# Patient Record
Sex: FEMALE | Race: BLACK OR AFRICAN AMERICAN | NOT HISPANIC OR LATINO
[De-identification: names, ages, dates, MRNs, and addresses within clinical notes are randomized per-mention and may not be internally consistent; named-entity substitution may affect disease eponyms.]

---

## 2021-02-08 PROBLEM — Z00.00 ENCOUNTER FOR PREVENTIVE HEALTH EXAMINATION: Status: ACTIVE | Noted: 2021-02-08

## 2021-02-10 ENCOUNTER — APPOINTMENT (OUTPATIENT)
Dept: SURGERY | Facility: CLINIC | Age: 26
End: 2021-02-10
Payer: COMMERCIAL

## 2021-02-10 ENCOUNTER — APPOINTMENT (OUTPATIENT)
Dept: BARIATRICS | Facility: CLINIC | Age: 26
End: 2021-02-10

## 2021-02-10 VITALS
SYSTOLIC BLOOD PRESSURE: 127 MMHG | BODY MASS INDEX: 45.98 KG/M2 | WEIGHT: 276 LBS | HEART RATE: 103 BPM | DIASTOLIC BLOOD PRESSURE: 82 MMHG | OXYGEN SATURATION: 100 % | HEIGHT: 65 IN

## 2021-02-10 DIAGNOSIS — H54.7 UNSPECIFIED VISUAL LOSS: ICD-10-CM

## 2021-02-10 PROCEDURE — 99243 OFF/OP CNSLTJ NEW/EST LOW 30: CPT

## 2021-02-10 PROCEDURE — 99072 ADDL SUPL MATRL&STAF TM PHE: CPT

## 2021-02-18 ENCOUNTER — NON-APPOINTMENT (OUTPATIENT)
Age: 26
End: 2021-02-18

## 2021-03-01 NOTE — HISTORY OF PRESENT ILLNESS
[de-identified] : RACHEAL BROOKE is a 25 year old female who present in the office with morbid obesity (BMI  45.93 kg/m2)  for bariatric surgery consultation. Patient was self  referred The patient has tried multiple methods to lose weight in the past including diets, Calorie-counting, restricted intake, portion control and exercise without any long term success. The patient has been obese all her life.  Patient lowest weight was 180 lb 6 years ago. Patient feels that weight loss surgery is the only option at this point for effective and clinically meaningful weight loss. Patient seek weight loss surgery for improvement of their activity level, health and comorbid conditions. Her  PMHX includes  PCOS. Patient stated that she is interested on Laparoscopic Sleeve Gastrectomy\par \par PCP KENDY Whittaker \par \par \par

## 2021-03-01 NOTE — CONSULT LETTER
[Dear  ___] : Dear  [unfilled], [Consult Closing:] : Thank you very much for allowing me to participate in the care of this patient.  If you have any questions, please do not hesitate to contact me. [Sincerely,] : Sincerely, [Consult Letter:] : I had the pleasure of evaluating your patient, [unfilled]. [FreeTextEntry3] : Dr Cedeño

## 2021-03-01 NOTE — PLAN
[FreeTextEntry1] : Please follow up at the office within 3 month and as needed for any questions or concerns \par Please make an appointment with Bariatric Coordinator at [facility]

## 2021-03-01 NOTE — ASSESSMENT
[FreeTextEntry1] : RACHEAL BROOKE is a 25 year old female with morbid obesity (BMI  45.93 kg/m2) for SLEEVE GASTRECTOMY Risks, benefits alternatives discussed. All questions answered. Risks discussed included: death, leak, stricture, DVT/PE, portomesenteric venous thrombosis, need for further procedures, tests or surgery, vitamin deficiency, gallstones, renal stones, inadequate weight loss, weight regain, need for open procedure, incisional hernia, bleeding, post-operative nausea/vomiting requiring hospitalization and intestinal obstruction.\par \par \par 1) Standard preoperative bariatric workup to include cardiology and pulmonology clearance, upper endoscopy with biopsy for H pylori, and preoperative fasting blood work, abdominal Ultrasound.\par 2) Patient will need to meet with psychologist and nutritionist for preoperative counseling regarding dietary goals, lifestyle changes, and postoperative expectations\par 3) We will assess the need for a medically-supervised diet, if required by the patient's insurer\par 4) Patient must attend 2 information session with bariatric coordinator and team\par 5) Patient should call insurance to ensure coverage for bariatric surgery\par 6) Bariatric Coordinator \par 7) Follow up 3 month \par

## 2021-03-01 NOTE — PHYSICAL EXAM
[Normal] : affect appropriate [Obese] : obese [de-identified] : Breath sounds equal and bilateral, no wheezing no rales or rhonchi  [de-identified] :  good S1, S2, no m/r/g bilateral  [de-identified] : Normoactive bowel sounds, soft and nontender, no hepatosplenomegaly or masses noted,

## 2021-03-10 ENCOUNTER — APPOINTMENT (OUTPATIENT)
Dept: SURGERY | Facility: CLINIC | Age: 26
End: 2021-03-10

## 2021-03-12 ENCOUNTER — RESULT REVIEW (OUTPATIENT)
Age: 26
End: 2021-03-12

## 2021-03-12 ENCOUNTER — APPOINTMENT (OUTPATIENT)
Dept: ULTRASOUND IMAGING | Facility: HOSPITAL | Age: 26
End: 2021-03-12
Payer: COMMERCIAL

## 2021-03-12 ENCOUNTER — OUTPATIENT (OUTPATIENT)
Dept: OUTPATIENT SERVICES | Facility: HOSPITAL | Age: 26
LOS: 1 days | End: 2021-03-12
Payer: COMMERCIAL

## 2021-03-12 DIAGNOSIS — E66.01 MORBID (SEVERE) OBESITY DUE TO EXCESS CALORIES: ICD-10-CM

## 2021-03-12 PROCEDURE — 76700 US EXAM ABDOM COMPLETE: CPT

## 2021-03-12 PROCEDURE — 76700 US EXAM ABDOM COMPLETE: CPT | Mod: 26

## 2021-03-15 LAB
25(OH)D3 SERPL-MCNC: 19.3 NG/ML
ALBUMIN SERPL ELPH-MCNC: 4.2 G/DL
ALP BLD-CCNC: 94 U/L
ALT SERPL-CCNC: 10 U/L
ANION GAP SERPL CALC-SCNC: 8 MMOL/L
APTT BLD: 31.7 SEC
AST SERPL-CCNC: 12 U/L
BASOPHILS # BLD AUTO: 0.05 K/UL
BASOPHILS NFR BLD AUTO: 0.5 %
BILIRUB SERPL-MCNC: 0.3 MG/DL
BUN SERPL-MCNC: 12 MG/DL
CALCIUM SERPL-MCNC: 9.6 MG/DL
CALCIUM SERPL-MCNC: 9.6 MG/DL
CHLORIDE SERPL-SCNC: 106 MMOL/L
CHOLEST SERPL-MCNC: 138 MG/DL
CO2 SERPL-SCNC: 25 MMOL/L
CREAT SERPL-MCNC: 1.03 MG/DL
EOSINOPHIL # BLD AUTO: 0.3 K/UL
EOSINOPHIL NFR BLD AUTO: 3.1 %
ESTIMATED AVERAGE GLUCOSE: 105 MG/DL
FERRITIN SERPL-MCNC: 35 NG/ML
FOLATE SERPL-MCNC: 5.1 NG/ML
GLUCOSE SERPL-MCNC: 86 MG/DL
HBA1C MFR BLD HPLC: 5.3 %
HCT VFR BLD CALC: 36.5 %
HDLC SERPL-MCNC: 40 MG/DL
HGB BLD-MCNC: 10.6 G/DL
IMM GRANULOCYTES NFR BLD AUTO: 0.3 %
INR PPP: 1.16 RATIO
IRON SATN MFR SERPL: 8 %
IRON SERPL-MCNC: 28 UG/DL
LDLC SERPL CALC-MCNC: 88 MG/DL
LYMPHOCYTES # BLD AUTO: 2.15 K/UL
LYMPHOCYTES NFR BLD AUTO: 22.3 %
MAN DIFF?: NORMAL
MCHC RBC-ENTMCNC: 24.9 PG
MCHC RBC-ENTMCNC: 29 GM/DL
MCV RBC AUTO: 85.9 FL
MONOCYTES # BLD AUTO: 0.51 K/UL
MONOCYTES NFR BLD AUTO: 5.3 %
NEUTROPHILS # BLD AUTO: 6.61 K/UL
NEUTROPHILS NFR BLD AUTO: 68.5 %
NONHDLC SERPL-MCNC: 98 MG/DL
PARATHYROID HORMONE INTACT: 19 PG/ML
PLATELET # BLD AUTO: 418 K/UL
POTASSIUM SERPL-SCNC: 4.1 MMOL/L
PROT SERPL-MCNC: 7.4 G/DL
PT BLD: 13.6 SEC
RBC # BLD: 4.25 M/UL
RBC # FLD: 14 %
SODIUM SERPL-SCNC: 139 MMOL/L
TIBC SERPL-MCNC: 336 UG/DL
TRIGL SERPL-MCNC: 54 MG/DL
TSH SERPL-ACNC: 1.48 UIU/ML
UIBC SERPL-MCNC: 308 UG/DL
VIT B12 SERPL-MCNC: 478 PG/ML
WBC # FLD AUTO: 9.65 K/UL

## 2021-03-17 ENCOUNTER — APPOINTMENT (OUTPATIENT)
Dept: SURGERY | Facility: CLINIC | Age: 26
End: 2021-03-17
Payer: COMMERCIAL

## 2021-03-17 VITALS
BODY MASS INDEX: 45.98 KG/M2 | DIASTOLIC BLOOD PRESSURE: 81 MMHG | WEIGHT: 276 LBS | HEART RATE: 90 BPM | HEIGHT: 65 IN | SYSTOLIC BLOOD PRESSURE: 123 MMHG

## 2021-03-17 PROCEDURE — 99072 ADDL SUPL MATRL&STAF TM PHE: CPT

## 2021-03-17 PROCEDURE — 99213 OFFICE O/P EST LOW 20 MIN: CPT

## 2021-03-19 LAB — VIT B1 SERPL-MCNC: 84.5 NMOL/L

## 2021-03-19 NOTE — ASSESSMENT
[FreeTextEntry1] : RACHEAL BROOKE is a 25 year old female with morbid obesity (BMI 45.93 kg/m2) who present in the office for pre-operative follow-up and weight management program in preparation for bariatric surgery.  Patient is here for monthly weigh ins. Today's weight is 276 lb and BMI 45.93 kg/m2\par Patient is making good food choices, working on eating smaller portions and becoming more mindful.  Patient has made a concerted effort to eat more balanced and nutritionally sound meals, and to engage in some level of physical activity on a regular basis. Patient continues to struggle with weight loss despite continuous concerted efforts since the prior visit.\par \par Today is 2nd month of weight ins

## 2021-03-19 NOTE — PLAN
[FreeTextEntry1] : Please follow up at the office within 1 month and as needed for any questions or concerns

## 2021-03-19 NOTE — REASON FOR VISIT
[Follow-Up Visit] : a follow-up visit for [Morbid Obesity (BMI>40)] : morbid obesity (bmi>40) [FreeTextEntry2] : Weigh ins

## 2021-03-19 NOTE — HISTORY OF PRESENT ILLNESS
[de-identified] : RACHEAL BROOKE is a 25 year old female with morbid obesity (BMI 45.93 kg/m2) who present in the office for pre-operative follow-up and weight management program in preparation for bariatric surgery.  Patient is here for monthly weigh ins. Today's weight is 276 lb and BMI 45.93 kg/m2. Patient is making good food choices, working on eating smaller portions and becoming more mindful.  Patient has made a concerted effort to eat more balanced and nutritionally sound meals, and to engage in some level of physical activity on a regular basis. Patient continues to struggle with weight loss despite continuous concerted efforts since the prior visit. Patient had Abdominal US that showed 6 mm gallbladder polyp. Patient is nonsymptomatic we will repeat abdominal US in 6 month \par \par

## 2021-03-19 NOTE — PHYSICAL EXAM
[Normal] : affect appropriate [Obese] : obese [de-identified] : Breath sounds equal and bilateral, no wheezing no rales or rhonchi  [de-identified] :  good S1, S2, no m/r/g bilateral  [de-identified] : Normoactive bowel sounds, soft and nontender, no hepatosplenomegaly or masses noted,

## 2021-04-12 ENCOUNTER — NON-APPOINTMENT (OUTPATIENT)
Age: 26
End: 2021-04-12

## 2021-04-12 ENCOUNTER — APPOINTMENT (OUTPATIENT)
Dept: CARDIOLOGY | Facility: CLINIC | Age: 26
End: 2021-04-12
Payer: COMMERCIAL

## 2021-04-12 ENCOUNTER — APPOINTMENT (OUTPATIENT)
Dept: GASTROENTEROLOGY | Facility: CLINIC | Age: 26
End: 2021-04-12
Payer: COMMERCIAL

## 2021-04-12 VITALS
TEMPERATURE: 97 F | SYSTOLIC BLOOD PRESSURE: 117 MMHG | DIASTOLIC BLOOD PRESSURE: 75 MMHG | OXYGEN SATURATION: 100 % | WEIGHT: 280 LBS | HEART RATE: 80 BPM | HEIGHT: 65 IN | BODY MASS INDEX: 46.65 KG/M2

## 2021-04-12 VITALS
HEIGHT: 65 IN | RESPIRATION RATE: 16 BRPM | DIASTOLIC BLOOD PRESSURE: 75 MMHG | BODY MASS INDEX: 46.65 KG/M2 | HEART RATE: 80 BPM | OXYGEN SATURATION: 100 % | TEMPERATURE: 97 F | WEIGHT: 280 LBS | SYSTOLIC BLOOD PRESSURE: 117 MMHG

## 2021-04-12 DIAGNOSIS — Z01.818 ENCOUNTER FOR OTHER PREPROCEDURAL EXAMINATION: ICD-10-CM

## 2021-04-12 PROCEDURE — 99072 ADDL SUPL MATRL&STAF TM PHE: CPT

## 2021-04-12 PROCEDURE — 99205 OFFICE O/P NEW HI 60 MIN: CPT

## 2021-04-12 PROCEDURE — 99204 OFFICE O/P NEW MOD 45 MIN: CPT

## 2021-04-12 PROCEDURE — 93000 ELECTROCARDIOGRAM COMPLETE: CPT

## 2021-04-12 NOTE — REVIEW OF SYSTEMS
[Feeling Tired] : feeling tired [Eyesight Problems] : eyesight problems [Heartburn] : heartburn [Negative] : Heme/Lymph [de-identified] : migraines

## 2021-04-12 NOTE — HISTORY OF PRESENT ILLNESS
[None] : had no significant interval events [Nausea] : denies nausea [Vomiting] : denies vomiting [Diarrhea] : denies diarrhea [Constipation] : denies constipation [Yellow Skin Or Eyes (Jaundice)] : denies jaundice [Abdominal Pain] : denies abdominal pain [Abdominal Swelling] : denies abdominal swelling [Rectal Pain] : denies rectal pain [Wt Gain ___ Lbs] : recent [unfilled] ~Upound(s) weight gain [Heartburn] : heartburn [GERD] : gastroesophageal reflux disease [Wt Loss ___ Lbs] : no recent weight loss [Hiatus Hernia] : no hiatus hernia [Peptic Ulcer Disease] : no peptic ulcer disease [Pancreatitis] : no pancreatitis [Cholelithiasis] : no cholelithiasis [Kidney Stone] : no kidney stone [Inflammatory Bowel Disease] : no inflammatory bowel disease [Irritable Bowel Syndrome] : no irritable bowel syndrome [Diverticulitis] : no diverticulitis [Alcohol Abuse] : no alcohol abuse [Malignancy] : no malignancy [Abdominal Surgery] : no abdominal surgery [Appendectomy] : no appendectomy [Cholecystectomy] : no cholecystectomy [de-identified] : The patient is a 26-year-old -American female with past medical history significant for polycystic ovarian disease who was referred to my office by Dr. Saul Hughes for occasional gastroesophageal reflux disease.  The patient also came to the office for evaluation for bariatric surgery with Dr. Neftali Cedeño for possible gastric sleeve surgery.   I was asked to render an opinion for consultation for the above complaints.   The patient states that she is feeling fine.  The patient denies any abdominal pain.  The patient denies any abdominal gas and bloating.  The patient denies any nausea or vomiting.  The patient complains of gastroesophageal reflux disease but denies any dysphagia.  The patient denies taking medications for the gastroesophageal reflux disease.  The gastroesophageal reflux disease is worse after meals and late at night and in the early morning. The patient denies taking medications for the gastroesophageal reflux disease such as proton pump inhibitors, H2 blockers or antacids.   The patient denies any atypical chest pain, shortness of breath or palpitations.  The patient denies any diaphoresis. The patient denies any constipation or diarrhea.  The patient has 1 bowel movement a day.  The patient denies a change in bowel habits.  The patient denies a change in caliber of stool.  The patient denies having mucus discharge with the bowel movements.  The patient denies any bright red blood per rectum, melena or hematemesis.  The patient denies any rectal pain or rectal pruritus. The patient denies any weight loss or anorexia.  The patient admits to gaining weight recently.  She denies any fevers or chills.  The patient denies any jaundice or pruritus.  The patient denies any back pain.  The patient admits to having a prior upper endoscopy performed by another gastroenterologist.  According to the patient, the upper endoscopic findings revealed gastritis and acid reflux disease.  The patient's last menstrual period was on April 10, 2021. The patient's periods are irregular.  The patient's menstrual periods are heavy for 4 to 5 days.  The patient is a .  The patient's first menstrual period was at age 10. The patient denies any significant family history of GI problems.   [de-identified] : (-) smoking, (-) ETOH, (-) IVDA\par

## 2021-04-12 NOTE — ASSESSMENT
[FreeTextEntry1] : GERD: The patient was advised to avoid late-night meals and dietary indiscretions.  The patient was advised to avoid fried and fatty foods.  The patient was advised to abide by an anti-GERD diet. The patient was given a pamphlet for anti-GERD.  The patient and I reviewed the anti-GERD diet at length. I recommend a trial of Omeprazole 40 mg once a day for the symptoms.\par Obesity: The patient is morbidly obese.  The patient was advised to lose weight and exercise.  The patient was advised to followup with a nutritionist regarding dieting for the weight loss. The patient cannot lose weight.  The patient is being evaluated with bariatric surgeon.   The patient is being followed for bariatric surgery for obesity with Dr. Cedeño for possible gastric sleeve surgery.  The patient agrees and will follow-up.  I recommend an upper endoscopy to assess for peptic ulcer disease versus esophagitis and any contraindications for bariatric surgery.  The patient was told of the risks and benefits of the procedure. The patient was told there was perforation, emergency surgery, bleeding, infections and missed lesions. The patient agreed and will schedule for the procedure. The patient was told to be n.p.o. after midnight. The patient is to return to the procedure.\par Upper Endoscopy: I recommend an upper endoscopy to assess for peptic ulcer disease versus esophagitis.  The patient was told of the risks and benefits of the procedure.  The patient was told of the risks of perforation, emergency surgery, bleeding, infections and missed lesions. The patient agreed and will schedule for procedure. The patient is to be n.p.o. after midnight.  The patient is to return for the procedure. \par Follow-up: The patient is to follow-up in the office in 4 weeks to reassess the symptoms. The patient was told to call the office if any further problems. \par \par

## 2021-04-14 ENCOUNTER — APPOINTMENT (OUTPATIENT)
Dept: SURGERY | Facility: CLINIC | Age: 26
End: 2021-04-14
Payer: COMMERCIAL

## 2021-04-14 VITALS
DIASTOLIC BLOOD PRESSURE: 78 MMHG | HEART RATE: 107 BPM | OXYGEN SATURATION: 99 % | SYSTOLIC BLOOD PRESSURE: 132 MMHG | WEIGHT: 278 LBS | HEIGHT: 65 IN | BODY MASS INDEX: 46.32 KG/M2

## 2021-04-14 PROCEDURE — 99072 ADDL SUPL MATRL&STAF TM PHE: CPT

## 2021-04-14 PROCEDURE — 99213 OFFICE O/P EST LOW 20 MIN: CPT

## 2021-04-15 ENCOUNTER — APPOINTMENT (OUTPATIENT)
Dept: PULMONOLOGY | Facility: CLINIC | Age: 26
End: 2021-04-15
Payer: COMMERCIAL

## 2021-04-15 VITALS
HEART RATE: 99 BPM | RESPIRATION RATE: 16 BRPM | SYSTOLIC BLOOD PRESSURE: 103 MMHG | WEIGHT: 278 LBS | DIASTOLIC BLOOD PRESSURE: 65 MMHG | BODY MASS INDEX: 46.32 KG/M2 | OXYGEN SATURATION: 98 % | TEMPERATURE: 98.1 F | HEIGHT: 65 IN

## 2021-04-15 PROCEDURE — 99072 ADDL SUPL MATRL&STAF TM PHE: CPT

## 2021-04-15 PROCEDURE — 99204 OFFICE O/P NEW MOD 45 MIN: CPT

## 2021-04-15 NOTE — PHYSICAL EXAM
[No Acute Distress] : no acute distress [Normal Oropharynx] : normal oropharynx [Normal Appearance] : normal appearance [No Neck Mass] : no neck mass [Normal Rate/Rhythm] : normal rate/rhythm [Normal S1, S2] : normal s1, s2 [No Murmurs] : no murmurs [No Resp Distress] : no resp distress [Clear to Auscultation Bilaterally] : clear to auscultation bilaterally [No Abnormalities] : no abnormalities [Benign] : benign [Normal Gait] : normal gait [No Cyanosis] : no cyanosis [No Clubbing] : no clubbing [No Edema] : no edema [FROM] : FROM [Normal Color/ Pigmentation] : normal color/ pigmentation [Oriented x3] : oriented x3 [Normal Affect] : normal affect

## 2021-04-23 NOTE — PLAN
[FreeTextEntry1] : Please follow up at the office within 1 month and as needed for any questions or concerns 
vital signs/nurses notes

## 2021-04-23 NOTE — REASON FOR VISIT
[Follow-Up Visit] : a follow-up visit for [Morbid Obesity (BMI>40)] : morbid obesity (bmi>40) [FreeTextEntry2] : Monthly weigh ins

## 2021-04-23 NOTE — CONSULT LETTER
[Dear  ___] : Dear  [unfilled], [Courtesy Letter:] : I had the pleasure of seeing your patient, [unfilled], in my office today. [Consult Closing:] : Thank you very much for allowing me to participate in the care of this patient.  If you have any questions, please do not hesitate to contact me. [Sincerely,] : Sincerely, [FreeTextEntry3] : Dr Cedeño

## 2021-04-23 NOTE — PHYSICAL EXAM
[Normal] : affect appropriate [Obese] : obese [de-identified] : Breath sounds equal and bilateral, no wheezing no rales or rhonchi  [de-identified] :  good S1, S2, no m/r/g bilateral  [de-identified] : Normoactive bowel sounds, soft and nontender, no hepatosplenomegaly or masses noted,

## 2021-04-23 NOTE — ASSESSMENT
[FreeTextEntry1] : RACHEAL BROOKE is a 25 year old female with morbid obesity (BMI 46.26 kg/m2) who present in the office for pre-operative follow-up and weight management program in preparation for bariatric surgery.  Patient is here for monthly weigh ins. Today's weight is 278 lb and BMI 46.26 kg/m2 Patient is making good food choices, working on eating smaller portions and becoming more mindful.  Patient has made a concerted effort to eat more balanced and nutritionally sound meals, and to engage in some level of physical activity on a regular basis. Patient continues to struggle with weight loss despite continuous concerted efforts since the prior visit.\par \par Today is 3 rd month of weigh-ins

## 2021-05-07 NOTE — HISTORY OF PRESENT ILLNESS
[de-identified] : RACHEAL BROOKE is a 26 year old female with morbid obesity (BMI _ kg/m2) who present in the office for pre-operative follow-up and weight management program in preparation for bariatric surgery.  Patient is here for monthly weigh ins. Today's weight is _ lb and BMI _ kg/m2\par Patient is making good food choices, working on eating smaller portions and becoming more mindful.  Patient has made a concerted effort to eat more balanced and nutritionally sound meals, and to engage in some level of physical activity on a regular basis. Patient continues to struggle with weight loss despite continuous concerted efforts since the prior visit.\par

## 2021-05-12 ENCOUNTER — APPOINTMENT (OUTPATIENT)
Dept: SURGERY | Facility: CLINIC | Age: 26
End: 2021-05-12
Payer: COMMERCIAL

## 2021-05-17 ENCOUNTER — APPOINTMENT (OUTPATIENT)
Dept: CV DIAGNOSITCS | Facility: HOSPITAL | Age: 26
End: 2021-05-17
Payer: COMMERCIAL

## 2021-05-17 ENCOUNTER — OUTPATIENT (OUTPATIENT)
Dept: OUTPATIENT SERVICES | Facility: HOSPITAL | Age: 26
LOS: 1 days | End: 2021-05-17

## 2021-05-17 DIAGNOSIS — R06.00 DYSPNEA, UNSPECIFIED: ICD-10-CM

## 2021-05-17 PROCEDURE — 93306 TTE W/DOPPLER COMPLETE: CPT | Mod: 26

## 2021-05-19 ENCOUNTER — APPOINTMENT (OUTPATIENT)
Dept: SURGERY | Facility: CLINIC | Age: 26
End: 2021-05-19
Payer: COMMERCIAL

## 2021-05-19 VITALS
SYSTOLIC BLOOD PRESSURE: 119 MMHG | HEART RATE: 98 BPM | DIASTOLIC BLOOD PRESSURE: 76 MMHG | HEIGHT: 65 IN | WEIGHT: 280 LBS | BODY MASS INDEX: 46.65 KG/M2 | TEMPERATURE: 98.2 F

## 2021-05-19 PROCEDURE — 99213 OFFICE O/P EST LOW 20 MIN: CPT

## 2021-05-19 PROCEDURE — 99072 ADDL SUPL MATRL&STAF TM PHE: CPT

## 2021-05-20 NOTE — CONSULT LETTER
[Dear  ___] : Dear  [unfilled], [Courtesy Letter:] : I had the pleasure of seeing your patient, [unfilled], in my office today. [Sincerely,] : Sincerely, [FreeTextEntry3] : Dr Cedeño

## 2021-05-20 NOTE — PHYSICAL EXAM
[Normal] : affect appropriate [Obese] : obese [de-identified] : Breath sounds equal and bilateral, no wheezing no rales or rhonchi  [de-identified] :  good S1, S2, no m/r/g bilateral  [de-identified] : Normoactive bowel sounds, soft and nontender, no hepatosplenomegaly or masses noted,

## 2021-05-20 NOTE — HISTORY OF PRESENT ILLNESS
[de-identified] : RACHEAL BROOKE is a 26 year old female with morbid obesity (BMI 46. 59 kg/m2) who present in the office for pre-operative follow-up and weight management program in preparation for Laparoscopic Sleeve Gastrectomy.  Patient is here for monthly weigh ins. Today's weight is 280 lb and BMI 46.59 kg/m2. Patient is making good food choices, working on eating smaller portions and becoming more mindful.  Patient has made a concerted effort to eat more balanced and nutritionally sound meals, and to engage in some level of physical activity on a regular basis. Patient continues to struggle with weight loss despite continuous concerted efforts since the prior visit. Patient seeing by  Gastroenterology pending for EGD,, Pulmonary Sleep study pending , Cardiologist Echo done and Registered Dietitian, Psychology need one more visit prior clearance \par

## 2021-05-20 NOTE — ASSESSMENT
[FreeTextEntry1] : RACHEAL BROOKE is a 26 year old female with morbid obesity (BMI 46.59 kg/m2) who present in the office for pre-operative follow-up and weight management program in preparation for bariatric surgery.  Patient is here for monthly weigh ins. Today's weight is 280 lb and BMI 46.59 kg/m2. Patient will continue to obtain the clearances. \par \par Today is month 4  of weight ins

## 2021-06-04 ENCOUNTER — APPOINTMENT (OUTPATIENT)
Dept: SLEEP CENTER | Facility: CLINIC | Age: 26
End: 2021-06-04
Payer: COMMERCIAL

## 2021-06-04 PROCEDURE — 95806 SLEEP STUDY UNATT&RESP EFFT: CPT | Mod: 26

## 2021-06-06 ENCOUNTER — OUTPATIENT (OUTPATIENT)
Dept: OUTPATIENT SERVICES | Facility: HOSPITAL | Age: 26
LOS: 1 days | End: 2021-06-06
Payer: COMMERCIAL

## 2021-06-06 PROCEDURE — 95806 SLEEP STUDY UNATT&RESP EFFT: CPT

## 2021-06-08 ENCOUNTER — APPOINTMENT (OUTPATIENT)
Dept: DISASTER EMERGENCY | Facility: CLINIC | Age: 26
End: 2021-06-08

## 2021-06-09 LAB — SARS-COV-2 N GENE NPH QL NAA+PROBE: NOT DETECTED

## 2021-06-11 ENCOUNTER — APPOINTMENT (OUTPATIENT)
Dept: PULMONOLOGY | Facility: CLINIC | Age: 26
End: 2021-06-11

## 2021-06-11 DIAGNOSIS — G47.33 OBSTRUCTIVE SLEEP APNEA (ADULT) (PEDIATRIC): ICD-10-CM

## 2021-06-11 NOTE — HISTORY OF PRESENT ILLNESS
[TextBox_4] : 26F here for preop evaluation for bariatric surgery. Pt reports no h/o respiratory illnesses, only childhood asthma which she grew out of,  non smoker, without any occupational exposures. Pt reports good exercise tolerance on flat surface and some difficulty climbing stairs. No cough, no chest pain or palpitations. \par Pt reports significant ,snoring, frequent night awakenings and daytime sleepiness with ESS>10 . This might be habitual as pt only gets 4-5 hrs of sleep nightly.

## 2021-06-11 NOTE — CONSULT LETTER
[Dear  ___] : Dear  [unfilled], [Consult Letter:] : I had the pleasure of evaluating your patient, [unfilled]. [Please see my note below.] : Please see my note below. [Consult Closing:] : Thank you very much for allowing me to participate in the care of this patient.  If you have any questions, please do not hesitate to contact me. [Sincerely,] : Sincerely, [FreeTextEntry3] : Irasema Carballo MD, [facility]

## 2021-06-16 ENCOUNTER — APPOINTMENT (OUTPATIENT)
Dept: SURGERY | Facility: CLINIC | Age: 26
End: 2021-06-16
Payer: COMMERCIAL

## 2021-06-16 VITALS
OXYGEN SATURATION: 99 % | HEART RATE: 96 BPM | BODY MASS INDEX: 47.32 KG/M2 | HEIGHT: 65 IN | WEIGHT: 284 LBS | SYSTOLIC BLOOD PRESSURE: 116 MMHG | DIASTOLIC BLOOD PRESSURE: 76 MMHG

## 2021-06-16 PROCEDURE — 99213 OFFICE O/P EST LOW 20 MIN: CPT

## 2021-06-16 PROCEDURE — 99072 ADDL SUPL MATRL&STAF TM PHE: CPT

## 2021-06-16 NOTE — ASSESSMENT
[FreeTextEntry1] : RACHEAL BROOKE is a 26 year old female with morbid obesity (BMI 47.26 kg/m2) who present in the office for pre-operative follow-up and weight management program in preparation for bariatric surgery.  Patient is here for monthly weigh ins. Today's weight is 284 lb and BMI 47.26 kg/m2\par Patient is making good food choices, working on eating smaller portions and becoming more mindful.  Patient has made a concerted effort to eat more balanced and nutritionally sound meals, and to engage in some level of physical activity on a regular basis. Patient continues to struggle with weight loss despite continuous concerted efforts since the prior visit.\par \par - Patient in process to obtain the clearances. Need to get a CPAP and start using prior the surgery. Registered Dietitian, Cardiology clearance pending.\par - Today is month 5  of weight ins

## 2021-06-16 NOTE — HISTORY OF PRESENT ILLNESS
[de-identified] : RACHEAL BROOKE is a 26 year old female with morbid obesity (BMI 47.26 kg/m2) who present in the office for pre-operative follow-up and weight management program in preparation for Laparoscopic Sleeve Gastrectomy. Patient is here for monthly weigh ins. Today's weight is 284 lb and BMI 47.26 kg/m2 she gained 4 lb since last vist. Patient seeing by  Gastroenterology, Pulmonary had PFT, Sleep Study recommended to get a CPAP, Cardiologist had Echo awaiting for clearance  and Registered Dietitian, Psychology need follow up and clearance. She had abdominal US done that showed 6 mm gallbladder polyp. Patient is making good food choices, working on eating smaller portions and becoming more mindful.  Patient has made a concerted effort to eat more balanced and nutritionally sound meals, and to engage in some level of physical activity on a regular basis. Patient continues to struggle with weight loss despite continuous concerted efforts since the prior visit.\par \par

## 2021-06-16 NOTE — PLAN
[FreeTextEntry1] : Please follow up at the office next month and as needed for any questions or concerns

## 2021-06-16 NOTE — PHYSICAL EXAM
[Normal] : affect appropriate [Obese] : obese [de-identified] : Breath sounds equal and bilateral, no wheezing no rales or rhonchi  [de-identified] :  good S1, S2, no m/r/g bilateral  [de-identified] : Normoactive bowel sounds, soft and nontender, no hepatosplenomegaly or masses noted,

## 2021-06-18 DIAGNOSIS — Z01.818 ENCOUNTER FOR OTHER PREPROCEDURAL EXAMINATION: ICD-10-CM

## 2021-06-28 ENCOUNTER — APPOINTMENT (OUTPATIENT)
Dept: DISASTER EMERGENCY | Facility: CLINIC | Age: 26
End: 2021-06-28

## 2021-06-29 LAB — SARS-COV-2 N GENE NPH QL NAA+PROBE: NOT DETECTED

## 2021-07-01 ENCOUNTER — RESULT REVIEW (OUTPATIENT)
Age: 26
End: 2021-07-01

## 2021-07-01 ENCOUNTER — OUTPATIENT (OUTPATIENT)
Dept: OUTPATIENT SERVICES | Facility: HOSPITAL | Age: 26
LOS: 1 days | End: 2021-07-01
Payer: COMMERCIAL

## 2021-07-01 ENCOUNTER — APPOINTMENT (OUTPATIENT)
Dept: GASTROENTEROLOGY | Facility: HOSPITAL | Age: 26
End: 2021-07-01

## 2021-07-01 DIAGNOSIS — Z98.84 BARIATRIC SURGERY STATUS: ICD-10-CM

## 2021-07-01 DIAGNOSIS — E66.01 MORBID (SEVERE) OBESITY DUE TO EXCESS CALORIES: ICD-10-CM

## 2021-07-01 DIAGNOSIS — K21.9 GASTRO-ESOPHAGEAL REFLUX DISEASE WITHOUT ESOPHAGITIS: ICD-10-CM

## 2021-07-01 LAB — HCG UR QL: NEGATIVE — SIGNIFICANT CHANGE UP

## 2021-07-01 PROCEDURE — 88305 TISSUE EXAM BY PATHOLOGIST: CPT | Mod: 26

## 2021-07-01 PROCEDURE — 88305 TISSUE EXAM BY PATHOLOGIST: CPT

## 2021-07-01 PROCEDURE — 81025 URINE PREGNANCY TEST: CPT

## 2021-07-01 PROCEDURE — 88312 SPECIAL STAINS GROUP 1: CPT | Mod: 26

## 2021-07-01 PROCEDURE — 43239 EGD BIOPSY SINGLE/MULTIPLE: CPT

## 2021-07-01 PROCEDURE — 88312 SPECIAL STAINS GROUP 1: CPT

## 2021-07-01 NOTE — CHART NOTE - NSCHARTNOTEFT_GEN_A_CORE
Esophagogastroduodenoscopy Report:    Indication:             GERD, Pre-op evaluation for bariatric surgery  Referring MD:       Dr. Saul Hughes, Dr. Neftali Cedeño  Instrument:  #        [id number]  Anesthesia:            MAC    Consent:  Informed consent was obtained from the patient after providing any opportunity for questions  Procedure: The gastroscope was gently passed through the incisoral orifice into the oral cavity and under direct visualization the esophagus was intubated. The endoscope was passed down the esophagus, through the stomach and into proximal jejunum. Color, texture, mucosa and anatomy of the esophagus, stomach, and duodenum were carefully examined with the scope. The patient tolerated the procedure well. After completion of the examination, the patient was transferred to the recovery room.     Procedure: Upper endoscopy and biopsies    Preparation: NPO     Findings:     Oropharynx:	   Normal appearing oropharynx.  Esophagus:	   Normal appearing esophagus with no ulcers, erosions, erythema noted.  Biopsy taken.  EG-junction:	   Normal appearing E-G junction at 35 cm. No hiatal hernia noted. No ulcers, erosions or erythema noted.  Cardia:	                 Normal appearing gastric mucosa with no ulcers, erosions or erythema noted. (+) Clear liquid suctioned.  Body:	                 Normal appearing gastric mucosa with no ulcers, erosions or erythema noted.  Biopsy taken.  Antrum:	                 Normal appearing gastric mucosa with no ulcers, erosions or erythema noted.  Biopsy taken.  Pylorus:	                 Normal appearing pylorus and pyloric channel with no ulcers, erosions or erythema noted.     Duodenal Bulb:         Normal appearing duodenal mucosa with no ulcers, erosions or erythema noted. Biopsy taken.  2nd portion:	   Normal appearing duodenal mucosa with no ulcers, erosions or erythema noted.  Biopsy taken.  3rd portion:	   Not visualized.      EBL:0    Impression: Small hiatal hernia, Mild diffuse bile gastritis    Plan:    1.  Avoid late night meals and dietary indiscretions.  2.  Avoid fried and fatty foods.  3.  Avoid nonsteroidal anti-inflammatory drugs and aspirin.  4.  Will check path results.  5.  Recommend a trial of Pantoprazole 40 mg once a day for 3 months  6. Recommend followup with bariatric surgeon for further workup and scheduling of bariatric surgery pending path results.  7.  Followup in office in 4 weeks to reassess the symptoms and discuss the findings.      Procedure Start Time:   Procedure End Time:         Attending:       Junito Rhoades M.D. Esophagogastroduodenoscopy Report:    Indication:             GERD, Pre-op evaluation for bariatric surgery  Referring MD:       Dr. Saul Hughes, Dr. Neftali Cedeño  Instrument:  #        [id number]  Anesthesia:            MAC    Consent:  Informed consent was obtained from the patient after providing any opportunity for questions  Procedure: The gastroscope was gently passed through the incisoral orifice into the oral cavity and under direct visualization the esophagus was intubated. The endoscope was passed down the esophagus, through the stomach and into proximal jejunum. Color, texture, mucosa and anatomy of the esophagus, stomach, and duodenum were carefully examined with the scope. The patient tolerated the procedure well. After completion of the examination, the patient was transferred to the recovery room.     Procedure: Upper endoscopy and biopsies    Preparation: NPO     Findings:     Oropharynx:	   Normal appearing oropharynx.  Esophagus:	   Normal appearing esophagus with no ulcers, erosions, erythema noted.  Biopsy taken.  EG-junction:	   Normal appearing E-G junction at 38 cm. (+) Small hiatal hernia noted. No ulcers, erosions or erythema noted.  Cardia:	                 Mild diffuse erythema suggestive of gastritis but no ulcers or erosions noted.  (+) Clear liquid suctioned.  Body:	                 Mild diffuse erythema suggestive of gastritis but no ulcers or erosions noted. Biopsy taken.  Antrum:	                 Mild diffuse erythema suggestive of gastritis but no ulcers or erosions noted. Biopsy taken.  Pylorus:	                 Normal appearing pylorus and pyloric channel with no ulcers, erosions or erythema noted.     Duodenal Bulb:         Normal appearing duodenal mucosa with no ulcers, erosions or erythema noted. Biopsy taken.  2nd portion:	   Normal appearing duodenal mucosa with no ulcers, erosions or erythema noted.  Biopsy taken.  3rd portion:	   Not visualized.      EBL:0    Impression: 1. Small hiatal hernia 2. Mild diffuse gastritis    Plan:    1.  Avoid late night meals and dietary indiscretions.  2.  Avoid fried and fatty foods.  3.  Avoid nonsteroidal anti-inflammatory drugs and aspirin.  4.  Will check path results.  5.  Recommend a trial of Pantoprazole 40 mg once a day for 3 months  6. Recommend followup with bariatric surgeon for further workup and scheduling of bariatric surgery pending path results.  7.  Followup in office in 4 weeks to reassess the symptoms and discuss the findings.      Procedure Start Time:  2:32 pm  Procedure End Time:   2:40 pm      Attending:       Junito Rhoades M.D.

## 2021-07-06 ENCOUNTER — APPOINTMENT (OUTPATIENT)
Dept: BARIATRICS | Facility: CLINIC | Age: 26
End: 2021-07-06

## 2021-07-06 LAB — SURGICAL PATHOLOGY STUDY: SIGNIFICANT CHANGE UP

## 2021-07-14 ENCOUNTER — APPOINTMENT (OUTPATIENT)
Dept: SURGERY | Facility: CLINIC | Age: 26
End: 2021-07-14
Payer: COMMERCIAL

## 2021-07-20 RX ORDER — OMEPRAZOLE 40 MG/1
40 CAPSULE, DELAYED RELEASE ORAL
Qty: 30 | Refills: 3 | Status: ACTIVE | COMMUNITY
Start: 2021-07-20 | End: 1900-01-01

## 2021-07-21 ENCOUNTER — APPOINTMENT (OUTPATIENT)
Dept: SURGERY | Facility: CLINIC | Age: 26
End: 2021-07-21
Payer: COMMERCIAL

## 2021-07-21 VITALS
SYSTOLIC BLOOD PRESSURE: 117 MMHG | OXYGEN SATURATION: 100 % | WEIGHT: 281 LBS | BODY MASS INDEX: 46.82 KG/M2 | HEART RATE: 79 BPM | HEIGHT: 65 IN | DIASTOLIC BLOOD PRESSURE: 72 MMHG

## 2021-07-21 VITALS — TEMPERATURE: 97.2 F

## 2021-07-21 PROCEDURE — 99072 ADDL SUPL MATRL&STAF TM PHE: CPT

## 2021-07-21 PROCEDURE — 99213 OFFICE O/P EST LOW 20 MIN: CPT

## 2021-07-21 RX ORDER — OMEPRAZOLE 40 MG/1
40 CAPSULE, DELAYED RELEASE ORAL TWICE DAILY
Qty: 30 | Refills: 3 | Status: DISCONTINUED | COMMUNITY
Start: 2021-04-12 | End: 2021-07-21

## 2021-08-04 NOTE — HISTORY OF PRESENT ILLNESS
[de-identified] : RACHEAL BROOKE is a 26 year old female with morbid obesity (BMI 46.76 kg/m2) who present in the office for pre-operative follow-up and weight management program in preparation for Laparoscopic Sleeve Gastrectomy.  Patient is here for monthly weigh ins. Today's weight 281 lb and BMI 46.76 kg/m2. Patient seeing by  Gastroenterology, Pulmonary, Cardiologist  and Registered Dietitian, Psychology. She is pending Cardiology, Psychology, Registered Dietitian clearances. She had sleep study done and waiting for CPAP and will start using it as soon as she can and need 4 weeks prior Laparoscopic Sleeve Gastrectomy\par

## 2021-08-04 NOTE — ASSESSMENT
[FreeTextEntry1] : RACHEAL BROOKE is a 26 year old female with morbid obesity (BMI 46.76 kg/m2) who present in the office for pre-operative follow-up and weight management program in preparation for. Laparoscopic Sleeve Gastrectomy.  Patient is here for monthly weigh ins. Today's weight is 281 lb and BMI 46.76 kg/m2. Patient seeing by  Gastroenterology, Pulmonary, Cardiologist  and Registered Dietitian, Psychology  \par \par \par Today is month 6 of weight ins, last weigh in. She is pending Cardiology, Psychology, Registered Dietitian clearances. She had sleep study done and waiting for CPAP and will start using it as soon as she can and need 4 weeks prior Laparoscopic Sleeve Gastrectomy\par

## 2021-08-16 ENCOUNTER — NON-APPOINTMENT (OUTPATIENT)
Age: 26
End: 2021-08-16

## 2021-08-16 ENCOUNTER — APPOINTMENT (OUTPATIENT)
Dept: CARDIOLOGY | Facility: CLINIC | Age: 26
End: 2021-08-16
Payer: COMMERCIAL

## 2021-08-16 VITALS
TEMPERATURE: 97.2 F | DIASTOLIC BLOOD PRESSURE: 79 MMHG | BODY MASS INDEX: 46.82 KG/M2 | HEIGHT: 65 IN | WEIGHT: 281 LBS | SYSTOLIC BLOOD PRESSURE: 119 MMHG | OXYGEN SATURATION: 99 % | RESPIRATION RATE: 16 BRPM | HEART RATE: 81 BPM

## 2021-08-16 DIAGNOSIS — Z13.6 ENCOUNTER FOR SCREENING FOR CARDIOVASCULAR DISORDERS: ICD-10-CM

## 2021-08-16 DIAGNOSIS — Z01.810 ENCOUNTER FOR PREPROCEDURAL CARDIOVASCULAR EXAMINATION: ICD-10-CM

## 2021-08-16 PROCEDURE — 99214 OFFICE O/P EST MOD 30 MIN: CPT

## 2021-08-16 PROCEDURE — 93000 ELECTROCARDIOGRAM COMPLETE: CPT

## 2021-08-23 ENCOUNTER — APPOINTMENT (OUTPATIENT)
Dept: GASTROENTEROLOGY | Facility: CLINIC | Age: 26
End: 2021-08-23
Payer: COMMERCIAL

## 2021-08-23 VITALS
HEART RATE: 89 BPM | SYSTOLIC BLOOD PRESSURE: 100 MMHG | OXYGEN SATURATION: 99 % | WEIGHT: 288 LBS | TEMPERATURE: 98 F | BODY MASS INDEX: 47.98 KG/M2 | DIASTOLIC BLOOD PRESSURE: 66 MMHG | HEIGHT: 65 IN

## 2021-08-23 DIAGNOSIS — K21.9 GASTRO-ESOPHAGEAL REFLUX DISEASE W/OUT ESOPHAGITIS: ICD-10-CM

## 2021-08-23 PROCEDURE — 99214 OFFICE O/P EST MOD 30 MIN: CPT

## 2021-08-23 RX ORDER — CLINDAMYCIN PHOSPHATE 1 G/10ML
1 GEL TOPICAL
Qty: 60 | Refills: 0 | Status: ACTIVE | COMMUNITY
Start: 2021-06-01

## 2021-08-23 RX ORDER — IBUPROFEN 600 MG/1
600 TABLET, FILM COATED ORAL
Qty: 21 | Refills: 0 | Status: ACTIVE | COMMUNITY
Start: 2021-06-21

## 2021-08-23 RX ORDER — SUCRALFATE 1 G/10ML
1 SUSPENSION ORAL
Qty: 3600 | Refills: 3 | Status: ACTIVE | COMMUNITY
Start: 2021-08-23 | End: 1900-01-01

## 2021-08-23 RX ORDER — CHLORHEXIDINE GLUCONATE, 0.12% ORAL RINSE 1.2 MG/ML
0.12 SOLUTION DENTAL
Qty: 473 | Refills: 0 | Status: ACTIVE | COMMUNITY
Start: 2021-06-21

## 2021-08-23 RX ORDER — PREDNISOLONE ACETATE 10 MG/ML
1 SUSPENSION/ DROPS OPHTHALMIC
Qty: 5 | Refills: 0 | Status: ACTIVE | COMMUNITY
Start: 2020-07-15

## 2021-08-23 RX ORDER — EMOLLIENT BASE
CREAM (GRAM) TOPICAL
Qty: 453 | Refills: 0 | Status: ACTIVE | COMMUNITY
Start: 2021-06-01

## 2021-08-23 NOTE — ASSESSMENT
[FreeTextEntry1] : Dyspepsia: The patient complains of dyspeptic symptoms.  The patient was advised to abide by an anti-gas diet.  The patient was given a pamphlet for anti-gas.  The patient and I reviewed the anti-gas diet at length. The patient is to start on a trial of Phazyme one tablet 3 times a day p.r.n. abdominal pain and gas.\par GERD: The patient was advised to avoid late-night meals and dietary indiscretions.  The patient was advised to avoid fried and fatty foods.  The patient was advised to abide by an anti-GERD diet. The patient was given a pamphlet for anti-GERD.  The patient and I reviewed the anti-GERD diet at length. I recommend continue on a trial of Omeprazole 40 mg once a day x 3 months for the symptoms.  I recommend a trial of Carafate suspension 1 gram/10 cc 4 times a day x 3 months for the symptoms.\par Gastritis: The patient has a history of gastritis. The patient is to avoid nonsteroidal anti-inflammatory drugs and aspirin. I recommend continue on a trial of Omeprazole 40 mg once a day for 3 months for the symptoms. \par Obesity: The patient is morbidly obese.  The patient was advised to lose weight and exercise.  The patient was advised to followup with a nutritionist regarding dieting for the weight loss. The patient cannot lose weight.  The patient is s/p upper endoscopy and has no contraindications to proceeding with bariatric surgery.   The patient is being evaluated with bariatric surgeon.   The patient is being followed for bariatric surgery for obesity with Dr. Cedeño.  The patient agrees and will followup.  \par Follow-up: The patient is to follow-up in the office in 6 months to reassess the symptoms. The patient was told to call the office if any further problems. \par \par

## 2021-08-23 NOTE — HISTORY OF PRESENT ILLNESS
[None] : had no significant interval events [Nausea] : denies nausea [Vomiting] : denies vomiting [Diarrhea] : denies diarrhea [Constipation] : denies constipation [Yellow Skin Or Eyes (Jaundice)] : denies jaundice [Abdominal Pain] : denies abdominal pain [Rectal Pain] : denies rectal pain [Heartburn] : heartburn [Abdominal Swelling] : abdominal swelling [GERD] : gastroesophageal reflux disease [Hiatus Hernia] : hiatus hernia [Wt Gain ___ Lbs] : no recent weight gain [Wt Loss ___ Lbs] : no recent weight loss [Peptic Ulcer Disease] : no peptic ulcer disease [Pancreatitis] : no pancreatitis [Kidney Stone] : no kidney stone [Cholelithiasis] : no cholelithiasis [Inflammatory Bowel Disease] : no inflammatory bowel disease [Irritable Bowel Syndrome] : no irritable bowel syndrome [Diverticulitis] : no diverticulitis [Alcohol Abuse] : no alcohol abuse [Malignancy] : no malignancy [Abdominal Surgery] : no abdominal surgery [Appendectomy] : no appendectomy [Cholecystectomy] : no cholecystectomy [de-identified] : The patient has a history significant for polycystic ovarian disease. The patient is being evaluated for bariatric surgery with Dr. Neftali Cedeño for possible gastric sleeve surgery. The patient states that she is feeling uncomfortable x 4 months. The patient denies any jaundice or pruritus.  The patient denies any chronic lower back pain. The patient denies any abdominal pain.  The patient complains of abdominal gas and bloating.  The patient denies any nausea or vomiting.  The patient complains of gastroesophageal reflux disease but denies any dysphagia.  The gastroesophageal reflux disease is worse after meals and late at night and in the early morning. The gastroesophageal reflux disease is slightly improved with proton pump inhibitors, H2 blockers and antacids.   The patient denies any atypical chest pain, shortness of breath or palpitations.  The patient denies any diaphoresis. The patient denies any constipation or diarrhea.  The patient has 1 bowel movements a day.  The patient denies a change in bowel habits.  The patient denies a change in caliber of stool.  The patient denies having mucus discharge with the bowel movements.  The patient denies any bright red blood per rectum, melena or hematemesis.  The patient denies any rectal pain or rectal pruritus.  The patient complains of fluctuating weight but denies any anorexia.  She denies any fevers or chills.  The patient had an upper endoscopy at the [facility] on July 1, 2021. The upper endoscopy was performed up to the level of the second portion of the duodenum. The upper endoscopy revealed a small hiatal hernia and mild diffuse gastritis. Biopsies were taken of the distal esophagus, antrum, body of stomach and duodenum to assess for esophagitis, gastritis and duodenitis. The pathology revealed distal esophagus with squamo-glandular mucosa with marked chronic inflammation, gastric antral mucosa with marked to moderate chronic gastritis with lymphoid aggregates that was negative for Helicobacter pylori, gastric antral and body mucosa with moderate to severe chronic gastritis with lymphoid aggregates that was negative for Helicobacter pylori and unremarkable duodenal mucosa.  The patient tolerated the procedure well.  The patient denies any significant family history of GI problems.  [de-identified] : (-) smoking, (-) ETOH, (-) IVDA\par

## 2021-09-24 ENCOUNTER — OUTPATIENT (OUTPATIENT)
Dept: OUTPATIENT SERVICES | Facility: HOSPITAL | Age: 26
LOS: 1 days | End: 2021-09-24
Payer: COMMERCIAL

## 2021-09-24 DIAGNOSIS — Z01.818 ENCOUNTER FOR OTHER PREPROCEDURAL EXAMINATION: ICD-10-CM

## 2021-09-24 DIAGNOSIS — E66.01 MORBID (SEVERE) OBESITY DUE TO EXCESS CALORIES: ICD-10-CM

## 2021-09-24 PROCEDURE — 71046 X-RAY EXAM CHEST 2 VIEWS: CPT | Mod: 26

## 2021-09-24 PROCEDURE — 71046 X-RAY EXAM CHEST 2 VIEWS: CPT

## 2021-09-25 ENCOUNTER — APPOINTMENT (OUTPATIENT)
Dept: DISASTER EMERGENCY | Facility: CLINIC | Age: 26
End: 2021-09-25

## 2021-09-26 LAB — SARS-COV-2 N GENE NPH QL NAA+PROBE: NOT DETECTED

## 2021-09-28 ENCOUNTER — APPOINTMENT (OUTPATIENT)
Dept: PULMONOLOGY | Facility: CLINIC | Age: 26
End: 2021-09-28
Payer: COMMERCIAL

## 2021-09-28 VITALS
OXYGEN SATURATION: 100 % | SYSTOLIC BLOOD PRESSURE: 112 MMHG | WEIGHT: 284 LBS | BODY MASS INDEX: 47.32 KG/M2 | HEART RATE: 78 BPM | HEIGHT: 65 IN | DIASTOLIC BLOOD PRESSURE: 75 MMHG

## 2021-09-28 DIAGNOSIS — Z01.811 ENCOUNTER FOR PREPROCEDURAL RESPIRATORY EXAMINATION: ICD-10-CM

## 2021-09-28 PROCEDURE — 94010 BREATHING CAPACITY TEST: CPT

## 2021-09-28 PROCEDURE — 99214 OFFICE O/P EST MOD 30 MIN: CPT | Mod: 25

## 2021-09-28 PROCEDURE — 94726 PLETHYSMOGRAPHY LUNG VOLUMES: CPT

## 2021-09-28 PROCEDURE — ZZZZZ: CPT

## 2021-09-28 PROCEDURE — 94729 DIFFUSING CAPACITY: CPT

## 2021-09-29 ENCOUNTER — APPOINTMENT (OUTPATIENT)
Dept: SURGERY | Facility: CLINIC | Age: 26
End: 2021-09-29
Payer: COMMERCIAL

## 2021-09-29 VITALS
BODY MASS INDEX: 47.48 KG/M2 | SYSTOLIC BLOOD PRESSURE: 110 MMHG | WEIGHT: 285 LBS | HEART RATE: 96 BPM | HEIGHT: 65 IN | DIASTOLIC BLOOD PRESSURE: 72 MMHG | OXYGEN SATURATION: 100 %

## 2021-09-29 PROBLEM — Z01.811 PREOP RESPIRATORY EXAM: Status: ACTIVE | Noted: 2021-04-15

## 2021-09-29 PROCEDURE — 99212 OFFICE O/P EST SF 10 MIN: CPT

## 2021-09-29 NOTE — HISTORY OF PRESENT ILLNESS
[TextBox_4] : 26F here for preop evaluation for bariatric surgery. Pt reports no h/o respiratory illnesses, only childhood asthma which she grew out of,  non smoker, without any occupational exposures. Pt reports good exercise tolerance on flat surface and some difficulty climbing stairs. No cough, no chest pain or palpitations. \par Pt reports significant snoring, frequent night awakenings and daytime sleepiness with ESS>10 . This might be habitual as pt only gets 4-5 hrs of sleep nightly.

## 2021-09-29 NOTE — CONSULT LETTER
[Dear  ___] : Dear  [unfilled], [Consult Letter:] : I had the pleasure of evaluating your patient, [unfilled]. [Please see my note below.] : Please see my note below. [Consult Closing:] : Thank you very much for allowing me to participate in the care of this patient.  If you have any questions, please do not hesitate to contact me. [Sincerely,] : Sincerely, [FreeTextEntry3] : Irasema Carballo MD, [facility]

## 2021-09-29 NOTE — ASSESSMENT
[FreeTextEntry1] : 26F for preop evaluation for bariatric surgery.\par \par clinically asymptomatic from pulmonary standpoint with good exercise tolerance\par PFT with borderline diffusion otherwise normal study\par Mild ERASMO on CPAP\par CXR clear\par \par Pt optimized to undergo elective bariatric surgery from pulmonary standpoint. Recommend routine kimberley and postop care. Continue nightly CPAP.

## 2021-10-01 NOTE — ASSESSMENT
[FreeTextEntry1] : 26F with morbid obesity in process for weight loss surgery. She has her CPAP and has been using it since.\par \par awaiting nutrition clearance, pulmonary note and letter of medical necessity.

## 2021-10-01 NOTE — HISTORY OF PRESENT ILLNESS
[de-identified] : RACHEAL BROOKE is a 25 year old female with morbid obesity (BMI 45.93 kg/m2) who present in the office for pre-operative follow-up and weight management program in preparation for bariatric surgery.  Patient is here for monthly weigh ins. There have been changes to her health thus far.\par \par

## 2021-10-01 NOTE — PHYSICAL EXAM
[Obese] : obese [Ulcers] : no ulcers [Normal] : affect appropriate [de-identified] : soft, NT, ND

## 2021-10-12 ENCOUNTER — APPOINTMENT (OUTPATIENT)
Dept: BARIATRICS | Facility: CLINIC | Age: 26
End: 2021-10-12

## 2021-10-13 ENCOUNTER — APPOINTMENT (OUTPATIENT)
Dept: BARIATRICS | Facility: CLINIC | Age: 26
End: 2021-10-13

## 2021-11-17 ENCOUNTER — APPOINTMENT (OUTPATIENT)
Dept: SURGERY | Facility: CLINIC | Age: 26
End: 2021-11-17
Payer: COMMERCIAL

## 2021-11-17 VITALS
HEART RATE: 101 BPM | DIASTOLIC BLOOD PRESSURE: 79 MMHG | BODY MASS INDEX: 47.15 KG/M2 | HEIGHT: 65 IN | SYSTOLIC BLOOD PRESSURE: 128 MMHG | WEIGHT: 283 LBS

## 2021-11-17 VITALS — TEMPERATURE: 97 F

## 2021-11-17 DIAGNOSIS — G47.19 OTHER HYPERSOMNIA: ICD-10-CM

## 2021-11-17 PROCEDURE — 99213 OFFICE O/P EST LOW 20 MIN: CPT

## 2021-11-17 RX ORDER — AMOXICILLIN 500 MG/1
500 CAPSULE ORAL
Qty: 21 | Refills: 0 | Status: DISCONTINUED | COMMUNITY
Start: 2021-06-21 | End: 2021-11-17

## 2021-11-17 RX ORDER — PREDNISOLONE SODIUM PHOSPHATE 20 MG/5ML
20 SOLUTION ORAL
Refills: 0 | Status: DISCONTINUED | COMMUNITY
End: 2021-11-17

## 2021-11-17 RX ORDER — OMEPRAZOLE 40 MG/1
40 CAPSULE, DELAYED RELEASE ORAL TWICE DAILY
Qty: 28 | Refills: 0 | Status: DISCONTINUED | COMMUNITY
Start: 2021-08-23 | End: 2021-11-17

## 2021-11-17 NOTE — PLAN
[FreeTextEntry1] : Please follow up at the office for postop visit  and as needed for any questions or concerns

## 2021-11-17 NOTE — ASSESSMENT
[FreeTextEntry1] : RACHEAL BROOKE is a 26 year old female SLEEVE GASTRECTOMY preop visit. Risks, benefits alternatives discussed. All questions answered. Risks discussed included: death, leak, stricture, DVT/PE, portomesenteric venous thrombosis, need for further procedures, tests or surgery, vitamin deficiency, gallstones, renal stones, inadequate weight loss, weight regain, need for open procedure, incisional hernia, bleeding, post-operative nausea/vomiting requiring hospitalization and intestinal obstruction.

## 2021-11-17 NOTE — CONSULT LETTER
[Consult Closing:] : Thank you very much for allowing me to participate in the care of this patient.  If you have any questions, please do not hesitate to contact me. [FreeTextEntry3] : Dr Cedeño

## 2021-11-17 NOTE — PHYSICAL EXAM
[Normal] : affect appropriate [Obese] : obese [de-identified] : Breath sounds equal and bilateral, no wheezing no rales or rhonchi  [de-identified] :  good S1, S2, no m/r/g bilateral  [de-identified] : Normoactive bowel sounds, soft and nontender, no hepatosplenomegaly or masses noted,

## 2021-11-19 DIAGNOSIS — Z01.818 ENCOUNTER FOR OTHER PREPROCEDURAL EXAMINATION: ICD-10-CM

## 2021-11-22 ENCOUNTER — OUTPATIENT (OUTPATIENT)
Dept: OUTPATIENT SERVICES | Facility: HOSPITAL | Age: 26
LOS: 1 days | End: 2021-11-22
Payer: COMMERCIAL

## 2021-11-22 VITALS
SYSTOLIC BLOOD PRESSURE: 128 MMHG | WEIGHT: 279.99 LBS | RESPIRATION RATE: 18 BRPM | OXYGEN SATURATION: 99 % | HEART RATE: 90 BPM | DIASTOLIC BLOOD PRESSURE: 79 MMHG | HEIGHT: 65 IN | TEMPERATURE: 98 F

## 2021-11-22 DIAGNOSIS — G47.33 OBSTRUCTIVE SLEEP APNEA (ADULT) (PEDIATRIC): ICD-10-CM

## 2021-11-22 DIAGNOSIS — H54.3 UNQUALIFIED VISUAL LOSS, BOTH EYES: ICD-10-CM

## 2021-11-22 DIAGNOSIS — E66.01 MORBID (SEVERE) OBESITY DUE TO EXCESS CALORIES: ICD-10-CM

## 2021-11-22 DIAGNOSIS — Z94.7 CORNEAL TRANSPLANT STATUS: Chronic | ICD-10-CM

## 2021-11-22 DIAGNOSIS — Z01.818 ENCOUNTER FOR OTHER PREPROCEDURAL EXAMINATION: ICD-10-CM

## 2021-11-22 DIAGNOSIS — K21.9 GASTRO-ESOPHAGEAL REFLUX DISEASE WITHOUT ESOPHAGITIS: ICD-10-CM

## 2021-11-22 LAB — BLD GP AB SCN SERPL QL: SIGNIFICANT CHANGE UP

## 2021-11-22 PROCEDURE — G0463: CPT

## 2021-11-22 PROCEDURE — 83036 HEMOGLOBIN GLYCOSYLATED A1C: CPT

## 2021-11-22 NOTE — H&P PST ADULT - ASSESSMENT
25 y/o female with history of ERASMO (mild) on CPAP (dx 6/2021), GERD/gastritis,  keratoconus of both eyes (compliant with medications and tx as prescribed)  is diagnosed with morbid (severe) obesity due to excess calories 25 y/o female with history of ERASMO (mild) on CPAP, GERD/gastritis,  keratoconus of both eyes (compliant with medications and tx as prescribed)  is diagnosed with morbid (severe) obesity due to excess calories

## 2021-11-22 NOTE — H&P PST ADULT - NEGATIVE RESPIRATORY AND THORAX SYMPTOMS
no wheezing/no dyspnea/no cough/no hemoptysis/no pleuritic chest pain no wheezing/no dyspnea/no hemoptysis/no pleuritic chest pain

## 2021-11-22 NOTE — H&P PST ADULT - PROBLEM SELECTOR PLAN 1
Scheduled for laparoscopic sleeve gastrectomy possible open, possible esophagogastroduodenoscopy 11/29/2021  Preoperative instructions discussed and given to patient.   Instructed patient to call [phone] to schedule COVID 19 test 72 hrs prior to surgery.   NPO after midnight the day before surgery.   Instructed to avoid aspirin and over the counter medications including vitamins and herbal medications one week before surgery.   Discussed use of chlorhexidine solution 4% the morning of surgery for pre-procedural skin preparation.   Verbal and written instructions were given to patient.   Patient verbalized understanding and is in agreement with plan of care Scheduled for laparoscopic sleeve gastrectomy possible open, possible esophagogastroduodenoscopy 11/29/2021  Preoperative instructions discussed and given to patient.   Instructed patient to call [phone] to schedule COVID 19 test 72 hrs prior to surgery.   NPO after midnight the day before surgery.   Instructed to avoid aspirin and over the counter medications including vitamins and herbal medications one week before surgery.   Discussed use of chlorhexidine solution 4% the morning of surgery for pre-procedural skin preparation.   Verbal and written instructions were given to patient.   Patient verbalized understanding and is in agreement with plan of care  Labs/EKG/MC - completed by PCP  Cardiac Clearance - on paper chart

## 2021-11-22 NOTE — H&P PST ADULT - CORNEAL ABRASION RISK
prednisolone for corneal transplant prednisolone for bilateral corneal transplant prednisolone for bilateral corneal transplant/daily eye drops

## 2021-11-22 NOTE — H&P PST ADULT - NSICDXPASTMEDICALHX_GEN_ALL_CORE_FT
PAST MEDICAL HISTORY:  Gastritis     Impaired vision in both eyes Keratoconus : dx at 17 years old    PCOS (polycystic ovarian syndrome)     Seasonal allergic rhinitis      PAST MEDICAL HISTORY:  Childhood asthma now resolved    Gastritis     History of migraine headaches due to vision problems    Impaired vision in both eyes Keratoconus : dx at 17 years old    PCOS (polycystic ovarian syndrome)     Seasonal allergic rhinitis

## 2021-11-22 NOTE — H&P PST ADULT - CARDIOVASCULAR
Acute Chest Syndrome  2003 and possibly 2008, with intubation, exchange transfusion  Avascular Necrosis of Bone  hips  DVT (deep venous thrombosis)    Personal History of PE (Pulmonary Embolism)  2008, post 6mo coumadin  Sickle Cell Anemia  SC details… detailed exam

## 2021-11-22 NOTE — H&P PST ADULT - PROBLEM SELECTOR PLAN 4
Patient has confirmed mild ERASMO per sleep study done (6/2021) - see paper chart for sleep study report  Compliant with CPAP - will obtain CPAP setting  Recommend maintaining perioperative Patient has confirmed mild ERASMO per sleep study done (6/2021) - see paper chart for sleep study report  Compliant with CPAP - will obtain CPAP setting  Recommend maintaining perioperative  PFT on chart  Patient was optimized for surgery by pulmonologist Patient has confirmed mild ERASMO per sleep study done (6/2021) - see paper chart for sleep study report  Compliant with CPAP - will obtain CPAP setting  Recommend maintaining perioperative  CXR and PFT are on paper chart  Patient was optimized for surgery by pulmonologist

## 2021-11-22 NOTE — H&P PST ADULT - NEUROLOGICAL SYMPTOMS
migraine headaches due to vision changes/headache migraine headaches associated with dx of keratoconus of both eyes/headache

## 2021-11-22 NOTE — H&P PST ADULT - PROBLEM SELECTOR PLAN 2
Continue prednisolone eye drops as prescribed and used the morning of surgery Continue prednisolone eye drops as prescribed and use the morning of surgery

## 2021-11-22 NOTE — H&P PST ADULT - OPHTHALMOLOGIC COMMENTS
h/o impaired vision bilaterally, keratoconus s/p bilateral cornea transplant 2020 and 2021 h/o keratoconus s/p bilateral cornea transplant 2020 and 2021

## 2021-11-22 NOTE — H&P PST ADULT - HISTORY OF PRESENT ILLNESS
27 y/o female with history of ERASMO (mild) on CPAP (dx 6/2021), gastritis, PCOS, seasonal allergic rhinitis, keratoconus of both eyes (dx at 17 years old) s/p corneal transplant 2020 and 2021, is diagnosed with morbid (severe) obesity due to excess calories. She is scheduled for laparoscopic sleeve gastrectomy possible open, possible esophagogastroduodenoscopy 11/29/2021 25 y/o female with history of mild ERASMO on CPAP (dx 6/2021), gastritis (on omeprazole), PCOS (not on medication), childhood asthma (now resolved), seasonal allergic rhinitis (on medication),  migraine headaches associated with impaired vision, keratoconus of both eyes (dx at 17 years old) s/p corneal transplant 2020 and 2021, is diagnosed with morbid (severe) obesity due to excess calories. She is scheduled for laparoscopic sleeve gastrectomy possible open, possible esophagogastroduodenoscopy 11/29/2021 25 y/o female with history of mild ERASMO on CPAP (dx 6/2021), GERD/gastritis (on omeprazole), PCOS (not on medication), childhood asthma (now resolved), seasonal allergic rhinitis (on medication),  migraine headaches associated with impaired vision, keratoconus of both eyes (dx at 17 years old) s/p corneal transplant 2020 and 2021, is diagnosed with morbid (severe) obesity due to excess calories. She is scheduled for laparoscopic sleeve gastrectomy possible open, possible esophagogastroduodenoscopy 11/29/2021

## 2021-11-22 NOTE — H&P PST ADULT - PROBLEM SELECTOR PLAN 3
Instructed to take omeprazole as prescribed with a sip of water the day of surgery.   Follow up with PCP post-surgery for management of GERD

## 2021-11-23 LAB
A1C WITH ESTIMATED AVERAGE GLUCOSE RESULT: 5.4 % — SIGNIFICANT CHANGE UP (ref 4–5.6)
ESTIMATED AVERAGE GLUCOSE: 108 MG/DL — SIGNIFICANT CHANGE UP (ref 68–114)

## 2021-11-26 ENCOUNTER — APPOINTMENT (OUTPATIENT)
Dept: DISASTER EMERGENCY | Facility: CLINIC | Age: 26
End: 2021-11-26

## 2021-11-29 ENCOUNTER — APPOINTMENT (OUTPATIENT)
Dept: SURGERY | Facility: HOSPITAL | Age: 26
End: 2021-11-29

## 2021-12-08 ENCOUNTER — APPOINTMENT (OUTPATIENT)
Dept: SURGERY | Facility: CLINIC | Age: 26
End: 2021-12-08

## 2021-12-22 PROBLEM — H54.3 UNQUALIFIED VISUAL LOSS, BOTH EYES: Chronic | Status: ACTIVE | Noted: 2021-11-22

## 2021-12-22 PROBLEM — E28.2 POLYCYSTIC OVARIAN SYNDROME: Chronic | Status: ACTIVE | Noted: 2021-11-22

## 2021-12-22 PROBLEM — J45.909 UNSPECIFIED ASTHMA, UNCOMPLICATED: Chronic | Status: ACTIVE | Noted: 2021-11-22

## 2021-12-22 PROBLEM — Z86.69 PERSONAL HISTORY OF OTHER DISEASES OF THE NERVOUS SYSTEM AND SENSE ORGANS: Chronic | Status: ACTIVE | Noted: 2021-11-22

## 2021-12-22 PROBLEM — J30.2 OTHER SEASONAL ALLERGIC RHINITIS: Chronic | Status: ACTIVE | Noted: 2021-11-22

## 2021-12-22 PROBLEM — K29.70 GASTRITIS, UNSPECIFIED, WITHOUT BLEEDING: Chronic | Status: ACTIVE | Noted: 2021-11-22

## 2021-12-29 ENCOUNTER — APPOINTMENT (OUTPATIENT)
Dept: SURGERY | Facility: CLINIC | Age: 26
End: 2021-12-29

## 2022-01-10 ENCOUNTER — APPOINTMENT (OUTPATIENT)
Dept: SURGERY | Facility: HOSPITAL | Age: 27
End: 2022-01-10

## 2022-01-11 ENCOUNTER — APPOINTMENT (OUTPATIENT)
Dept: SURGERY | Facility: CLINIC | Age: 27
End: 2022-01-11
Payer: COMMERCIAL

## 2022-01-11 VITALS
WEIGHT: 281 LBS | BODY MASS INDEX: 46.82 KG/M2 | HEART RATE: 109 BPM | DIASTOLIC BLOOD PRESSURE: 77 MMHG | SYSTOLIC BLOOD PRESSURE: 132 MMHG | HEIGHT: 65 IN

## 2022-01-11 VITALS — TEMPERATURE: 97 F

## 2022-01-11 DIAGNOSIS — K29.30 CHRONIC SUPERFICIAL GASTRITIS W/OUT BLEEDING: ICD-10-CM

## 2022-01-11 DIAGNOSIS — G47.33 OBSTRUCTIVE SLEEP APNEA (ADULT) (PEDIATRIC): ICD-10-CM

## 2022-01-11 DIAGNOSIS — R06.00 DYSPNEA, UNSPECIFIED: ICD-10-CM

## 2022-01-11 DIAGNOSIS — E28.2 POLYCYSTIC OVARIAN SYNDROME: ICD-10-CM

## 2022-01-11 DIAGNOSIS — R06.83 SNORING: ICD-10-CM

## 2022-01-11 DIAGNOSIS — R63.8 OTHER SYMPTOMS AND SIGNS CONCERNING FOOD AND FLUID INTAKE: ICD-10-CM

## 2022-01-11 PROCEDURE — 99213 OFFICE O/P EST LOW 20 MIN: CPT

## 2022-01-19 ENCOUNTER — APPOINTMENT (OUTPATIENT)
Dept: SURGERY | Facility: CLINIC | Age: 27
End: 2022-01-19

## 2022-02-02 LAB
ALBUMIN SERPL ELPH-MCNC: 3.9 G/DL
ALP BLD-CCNC: 108 U/L
ALT SERPL-CCNC: 7 U/L
ANION GAP SERPL CALC-SCNC: 14 MMOL/L
APTT BLD: 31.7 SEC
AST SERPL-CCNC: 10 U/L
BASOPHILS # BLD AUTO: 0.04 K/UL
BASOPHILS NFR BLD AUTO: 0.4 %
BILIRUB SERPL-MCNC: 0.3 MG/DL
BUN SERPL-MCNC: 12 MG/DL
CALCIUM SERPL-MCNC: 9.3 MG/DL
CHLORIDE SERPL-SCNC: 106 MMOL/L
CO2 SERPL-SCNC: 20 MMOL/L
CREAT SERPL-MCNC: 1.14 MG/DL
EOSINOPHIL # BLD AUTO: 0.1 K/UL
EOSINOPHIL NFR BLD AUTO: 1 %
GLUCOSE SERPL-MCNC: 87 MG/DL
HCG SERPL QL: NEGATIVE
HCT VFR BLD CALC: 34.1 %
HGB BLD-MCNC: 9.7 G/DL
IMM GRANULOCYTES NFR BLD AUTO: 0.3 %
INR PPP: 1.2 RATIO
LYMPHOCYTES # BLD AUTO: 2.16 K/UL
LYMPHOCYTES NFR BLD AUTO: 21.8 %
MAN DIFF?: NORMAL
MCHC RBC-ENTMCNC: 23 PG
MCHC RBC-ENTMCNC: 28.4 GM/DL
MCV RBC AUTO: 80.8 FL
MONOCYTES # BLD AUTO: 0.49 K/UL
MONOCYTES NFR BLD AUTO: 4.9 %
NEUTROPHILS # BLD AUTO: 7.08 K/UL
NEUTROPHILS NFR BLD AUTO: 71.6 %
PAPP-A SERPL-ACNC: <1 MIU/ML
PLATELET # BLD AUTO: 488 K/UL
POTASSIUM SERPL-SCNC: 4.1 MMOL/L
PROT SERPL-MCNC: 7.5 G/DL
PT BLD: 14.2 SEC
RBC # BLD: 4.22 M/UL
RBC # FLD: 14.5 %
SODIUM SERPL-SCNC: 139 MMOL/L
WBC # FLD AUTO: 9.9 K/UL

## 2022-02-03 ENCOUNTER — OUTPATIENT (OUTPATIENT)
Dept: OUTPATIENT SERVICES | Facility: HOSPITAL | Age: 27
LOS: 1 days | End: 2022-02-03
Payer: COMMERCIAL

## 2022-02-03 VITALS
SYSTOLIC BLOOD PRESSURE: 136 MMHG | HEART RATE: 83 BPM | RESPIRATION RATE: 17 BRPM | HEIGHT: 65 IN | DIASTOLIC BLOOD PRESSURE: 78 MMHG | TEMPERATURE: 99 F | OXYGEN SATURATION: 100 % | WEIGHT: 279.99 LBS

## 2022-02-03 DIAGNOSIS — Z01.818 ENCOUNTER FOR OTHER PREPROCEDURAL EXAMINATION: ICD-10-CM

## 2022-02-03 DIAGNOSIS — G43.909 MIGRAINE, UNSPECIFIED, NOT INTRACTABLE, WITHOUT STATUS MIGRAINOSUS: ICD-10-CM

## 2022-02-03 DIAGNOSIS — Z94.7 CORNEAL TRANSPLANT STATUS: Chronic | ICD-10-CM

## 2022-02-03 DIAGNOSIS — E66.01 MORBID (SEVERE) OBESITY DUE TO EXCESS CALORIES: ICD-10-CM

## 2022-02-03 DIAGNOSIS — Z29.9 ENCOUNTER FOR PROPHYLACTIC MEASURES, UNSPECIFIED: ICD-10-CM

## 2022-02-03 DIAGNOSIS — H54.7 UNSPECIFIED VISUAL LOSS: ICD-10-CM

## 2022-02-03 DIAGNOSIS — G47.33 OBSTRUCTIVE SLEEP APNEA (ADULT) (PEDIATRIC): ICD-10-CM

## 2022-02-03 LAB — BLD GP AB SCN SERPL QL: SIGNIFICANT CHANGE UP

## 2022-02-03 PROCEDURE — G0463: CPT

## 2022-02-03 RX ORDER — HYDROXYZINE HCL 10 MG
1 TABLET ORAL
Qty: 0 | Refills: 0 | DISCHARGE

## 2022-02-03 NOTE — H&P PST ADULT - FALL HARM RISK - UNIVERSAL INTERVENTIONS
Bed in lowest position, wheels locked, appropriate side rails in place/Call bell, personal items and telephone in reach/Instruct patient to call for assistance before getting out of bed or chair/Non-slip footwear when patient is out of bed/[place] to call system/Physically safe environment - no spills, clutter or unnecessary equipment/Purposeful Proactive Rounding/Room/bathroom lighting operational, light cord in reach

## 2022-02-03 NOTE — H&P PST ADULT - PROBLEM SELECTOR PLAN 5
Pt diagnosed with Keratoconus had bilateral corneal transplant- administer pt's eye drops brought from home

## 2022-02-03 NOTE — H&P PST ADULT - HISTORY OF PRESENT ILLNESS
26 yr old female morbid obesity (BMI=46) tried all means to loose weight unable, history of sleep apnea (CPAP mild), PCOS was on metformin but unable to tolerate, Migraines (takes Excedrin) told to stop three days before surgery. Migraines are not constant. Pt had Keratoconus had bilateral corneal transplant. Pt had undergone all the necessary requirements for sleeve gastrectomy. Pt schedule for robotic assisted laparoscopic sleeve gastrectomy possible open on 2/1o/2022.

## 2022-02-03 NOTE — H&P PST ADULT - NSICDXPASTMEDICALHX_GEN_ALL_CORE_FT
PAST MEDICAL HISTORY:  Childhood asthma now resolved    Gastritis     History of migraine headaches due to vision problems    Impaired vision in both eyes Keratoconus : dx at 17 years old    Morbid (severe) obesity due to excess calories     PCOS (polycystic ovarian syndrome)     Seasonal allergic rhinitis

## 2022-02-03 NOTE — H&P PST ADULT - PROBLEM SELECTOR PLAN 2
Pt confirmed ERASMO on CPAP mild. Pt will bring machine am of surgery. Pt to maintain patent airway during and upon discharge

## 2022-02-07 NOTE — PLAN
[FreeTextEntry1] : Please follow up at the office for postop and as needed for any questions or concerns

## 2022-02-07 NOTE — HISTORY OF PRESENT ILLNESS
[de-identified] : RACHEAL BROOKE is a 26 year old female has undergone extensive workup and has been deemed an appropriate candidate for Laparoscopic Sleeve Gastrectomy. Patient is dong well, Patient is pending insurance pre-certification and pre-approval. She has an issue with insurance for pre approval in the past. She will be schedule to next available day. \par \par

## 2022-02-07 NOTE — REASON FOR VISIT
[Follow-Up Visit] : a follow-up visit for [Morbid Obesity (BMI>40)] : morbid obesity (bmi>40) [FreeTextEntry2] : Laparoscopic Sleeve Gastrectomy

## 2022-02-07 NOTE — PHYSICAL EXAM
[Normal] : affect appropriate [de-identified] : Breath sounds equal and bilateral, no wheezing no rales or rhonchi  [de-identified] :  good S1, S2, no m/r/g bilateral  [de-identified] : Normoactive bowel sounds, soft and nontender, no hepatosplenomegaly or masses noted,

## 2022-02-07 NOTE — ASSESSMENT
[FreeTextEntry1] : RACHEAL BROOKE is a 26 year old female with morbid obesity (BMI 46.76 kg/m2) who present in the office for pre-operative follow-up and weight management program. She has undergone extensive workup and has been deemed an appropriate candidate for Laparoscopic Sleeve Gastrectomy. \par \par \par Today patient is doing well, offers no complaints. She has an issue with insurance for pre approval in the past. We will plan for surgery on at the next available date, pending any required insurance pre-certification or pre-approval\par \par Patient's questions and concerns addressed to patient's satisfaction.

## 2022-02-09 ENCOUNTER — TRANSCRIPTION ENCOUNTER (OUTPATIENT)
Age: 27
End: 2022-02-09

## 2022-02-09 LAB — SARS-COV-2 N GENE NPH QL NAA+PROBE: NOT DETECTED

## 2022-02-10 ENCOUNTER — INPATIENT (INPATIENT)
Facility: HOSPITAL | Age: 27
LOS: 0 days | Discharge: HOME | DRG: 621 | End: 2022-02-11
Attending: SURGERY | Admitting: SURGERY
Payer: COMMERCIAL

## 2022-02-10 ENCOUNTER — APPOINTMENT (OUTPATIENT)
Dept: SURGERY | Facility: HOSPITAL | Age: 27
End: 2022-02-10

## 2022-02-10 ENCOUNTER — RESULT REVIEW (OUTPATIENT)
Age: 27
End: 2022-02-10

## 2022-02-10 VITALS
HEIGHT: 65 IN | DIASTOLIC BLOOD PRESSURE: 69 MMHG | RESPIRATION RATE: 16 BRPM | SYSTOLIC BLOOD PRESSURE: 115 MMHG | HEART RATE: 97 BPM | OXYGEN SATURATION: 100 % | WEIGHT: 279.99 LBS | TEMPERATURE: 100 F

## 2022-02-10 DIAGNOSIS — Z94.7 CORNEAL TRANSPLANT STATUS: Chronic | ICD-10-CM

## 2022-02-10 DIAGNOSIS — E66.01 MORBID (SEVERE) OBESITY DUE TO EXCESS CALORIES: ICD-10-CM

## 2022-02-10 LAB
BLD GP AB SCN SERPL QL: SIGNIFICANT CHANGE UP
HCG UR QL: NEGATIVE — SIGNIFICANT CHANGE UP

## 2022-02-10 PROCEDURE — 43775 LAP SLEEVE GASTRECTOMY: CPT

## 2022-02-10 PROCEDURE — 88307 TISSUE EXAM BY PATHOLOGIST: CPT | Mod: 26

## 2022-02-10 PROCEDURE — 43775 LAP SLEEVE GASTRECTOMY: CPT | Mod: AS

## 2022-02-10 PROCEDURE — 88312 SPECIAL STAINS GROUP 1: CPT | Mod: 26

## 2022-02-10 DEVICE — XI STAPLER SUREFORM RELOAD 60 BLACK: Type: IMPLANTABLE DEVICE | Status: FUNCTIONAL

## 2022-02-10 DEVICE — STAPLER COVIDIEN TRI-STAPLE 60MM BLACK INTELLIGENT RELOAD: Type: IMPLANTABLE DEVICE | Status: FUNCTIONAL

## 2022-02-10 DEVICE — CLIP APPLIER COVIDIEN ENDOCLIP III 5MM: Type: IMPLANTABLE DEVICE | Status: FUNCTIONAL

## 2022-02-10 DEVICE — CLIP APPLIER COVIDIEN ENDOCLIP II 10MM MED/LG: Type: IMPLANTABLE DEVICE | Status: FUNCTIONAL

## 2022-02-10 DEVICE — XI STAPLER SUREFORM RELOAD 60 WHITE: Type: IMPLANTABLE DEVICE | Status: FUNCTIONAL

## 2022-02-10 DEVICE — STAPLER COVIDIEN TRI-STAPLE 45MM BLACK INTELLIGENT RELOAD: Type: IMPLANTABLE DEVICE | Status: FUNCTIONAL

## 2022-02-10 DEVICE — XI STAPLER SUREFORM RELOAD 60 GREEN: Type: IMPLANTABLE DEVICE | Status: FUNCTIONAL

## 2022-02-10 DEVICE — XI STAPLER SUREFORM RELOAD 60 BLUE: Type: IMPLANTABLE DEVICE | Status: FUNCTIONAL

## 2022-02-10 DEVICE — STAPLER COVIDIEN TRI-STAPLE 45MM PURPLE INTELLIGENT RELOAD: Type: IMPLANTABLE DEVICE | Status: FUNCTIONAL

## 2022-02-10 DEVICE — IMPLANTABLE DEVICE: Type: IMPLANTABLE DEVICE | Status: FUNCTIONAL

## 2022-02-10 DEVICE — SEALANT VISTASEAL FIBRIN HUMAN 4ML: Type: IMPLANTABLE DEVICE | Status: FUNCTIONAL

## 2022-02-10 DEVICE — STAPLER COVIDIEN TRI-STAPLE 60MM PURPLE INTELLIGENT RELOAD: Type: IMPLANTABLE DEVICE | Status: FUNCTIONAL

## 2022-02-10 RX ORDER — FENTANYL CITRATE 50 UG/ML
25 INJECTION INTRAVENOUS
Refills: 0 | Status: DISCONTINUED | OUTPATIENT
Start: 2022-02-10 | End: 2022-02-10

## 2022-02-10 RX ORDER — PANTOPRAZOLE SODIUM 20 MG/1
40 TABLET, DELAYED RELEASE ORAL DAILY
Refills: 0 | Status: DISCONTINUED | OUTPATIENT
Start: 2022-02-10 | End: 2022-02-11

## 2022-02-10 RX ORDER — ONDANSETRON 8 MG/1
4 TABLET, FILM COATED ORAL
Refills: 0 | Status: DISCONTINUED | OUTPATIENT
Start: 2022-02-10 | End: 2022-02-11

## 2022-02-10 RX ORDER — ENOXAPARIN SODIUM 100 MG/ML
40 INJECTION SUBCUTANEOUS EVERY 12 HOURS
Refills: 0 | Status: DISCONTINUED | OUTPATIENT
Start: 2022-02-11 | End: 2022-02-11

## 2022-02-10 RX ORDER — ACETAMINOPHEN 500 MG
1000 TABLET ORAL EVERY 6 HOURS
Refills: 0 | Status: COMPLETED | OUTPATIENT
Start: 2022-02-10 | End: 2022-02-11

## 2022-02-10 RX ORDER — SCOPALAMINE 1 MG/3D
1 PATCH, EXTENDED RELEASE TRANSDERMAL ONCE
Refills: 0 | Status: COMPLETED | OUTPATIENT
Start: 2022-02-10 | End: 2022-02-10

## 2022-02-10 RX ORDER — SODIUM CHLORIDE 9 MG/ML
3 INJECTION INTRAMUSCULAR; INTRAVENOUS; SUBCUTANEOUS EVERY 8 HOURS
Refills: 0 | Status: DISCONTINUED | OUTPATIENT
Start: 2022-02-10 | End: 2022-02-10

## 2022-02-10 RX ORDER — KETOROLAC TROMETHAMINE 30 MG/ML
15 SYRINGE (ML) INJECTION EVERY 6 HOURS
Refills: 0 | Status: DISCONTINUED | OUTPATIENT
Start: 2022-02-10 | End: 2022-02-11

## 2022-02-10 RX ORDER — METOCLOPRAMIDE HCL 10 MG
10 TABLET ORAL ONCE
Refills: 0 | Status: COMPLETED | OUTPATIENT
Start: 2022-02-10 | End: 2022-02-10

## 2022-02-10 RX ORDER — ENOXAPARIN SODIUM 100 MG/ML
40 INJECTION SUBCUTANEOUS ONCE
Refills: 0 | Status: COMPLETED | OUTPATIENT
Start: 2022-02-10 | End: 2022-02-10

## 2022-02-10 RX ORDER — HYOSCYAMINE SULFATE 0.13 MG
0.12 TABLET ORAL EVERY 6 HOURS
Refills: 0 | Status: DISCONTINUED | OUTPATIENT
Start: 2022-02-10 | End: 2022-02-11

## 2022-02-10 RX ORDER — SODIUM CHLORIDE 9 MG/ML
1000 INJECTION, SOLUTION INTRAVENOUS
Refills: 0 | Status: DISCONTINUED | OUTPATIENT
Start: 2022-02-10 | End: 2022-02-11

## 2022-02-10 RX ORDER — BENZOCAINE AND MENTHOL 5; 1 G/100ML; G/100ML
1 LIQUID ORAL EVERY 4 HOURS
Refills: 0 | Status: DISCONTINUED | OUTPATIENT
Start: 2022-02-10 | End: 2022-02-11

## 2022-02-10 RX ORDER — PANTOPRAZOLE SODIUM 20 MG/1
40 TABLET, DELAYED RELEASE ORAL EVERY 24 HOURS
Refills: 0 | Status: DISCONTINUED | OUTPATIENT
Start: 2022-02-10 | End: 2022-02-10

## 2022-02-10 RX ORDER — PREDNISOLONE SODIUM PHOSPHATE 1 %
2 DROPS OPHTHALMIC (EYE)
Qty: 0 | Refills: 0 | DISCHARGE

## 2022-02-10 RX ORDER — SODIUM CHLORIDE 9 MG/ML
1000 INJECTION, SOLUTION INTRAVENOUS
Refills: 0 | Status: DISCONTINUED | OUTPATIENT
Start: 2022-02-10 | End: 2022-02-10

## 2022-02-10 RX ORDER — METOCLOPRAMIDE HCL 10 MG
10 TABLET ORAL
Refills: 0 | Status: DISCONTINUED | OUTPATIENT
Start: 2022-02-10 | End: 2022-02-11

## 2022-02-10 RX ORDER — FENTANYL CITRATE 50 UG/ML
50 INJECTION INTRAVENOUS
Refills: 0 | Status: DISCONTINUED | OUTPATIENT
Start: 2022-02-10 | End: 2022-02-10

## 2022-02-10 RX ORDER — GABAPENTIN 400 MG/1
300 CAPSULE ORAL ONCE
Refills: 0 | Status: COMPLETED | OUTPATIENT
Start: 2022-02-10 | End: 2022-02-10

## 2022-02-10 RX ADMIN — FENTANYL CITRATE 50 MICROGRAM(S): 50 INJECTION INTRAVENOUS at 14:33

## 2022-02-10 RX ADMIN — Medication 10 MILLIGRAM(S): at 21:12

## 2022-02-10 RX ADMIN — ONDANSETRON 4 MILLIGRAM(S): 8 TABLET, FILM COATED ORAL at 17:37

## 2022-02-10 RX ADMIN — FENTANYL CITRATE 50 MICROGRAM(S): 50 INJECTION INTRAVENOUS at 14:00

## 2022-02-10 RX ADMIN — Medication 400 MILLIGRAM(S): at 17:37

## 2022-02-10 RX ADMIN — SODIUM CHLORIDE 75 MILLILITER(S): 9 INJECTION, SOLUTION INTRAVENOUS at 17:38

## 2022-02-10 RX ADMIN — SCOPALAMINE 1 PATCH: 1 PATCH, EXTENDED RELEASE TRANSDERMAL at 19:37

## 2022-02-10 RX ADMIN — Medication 15 MILLIGRAM(S): at 18:10

## 2022-02-10 RX ADMIN — BENZOCAINE AND MENTHOL 1 LOZENGE: 5; 1 LIQUID ORAL at 19:37

## 2022-02-10 RX ADMIN — Medication 15 MILLIGRAM(S): at 17:37

## 2022-02-10 RX ADMIN — Medication 1000 MILLIGRAM(S): at 18:10

## 2022-02-10 RX ADMIN — SODIUM CHLORIDE 3 MILLILITER(S): 9 INJECTION INTRAMUSCULAR; INTRAVENOUS; SUBCUTANEOUS at 09:23

## 2022-02-10 RX ADMIN — Medication 10 MILLIGRAM(S): at 14:10

## 2022-02-10 RX ADMIN — SCOPALAMINE 1 PATCH: 1 PATCH, EXTENDED RELEASE TRANSDERMAL at 09:24

## 2022-02-10 RX ADMIN — ENOXAPARIN SODIUM 40 MILLIGRAM(S): 100 INJECTION SUBCUTANEOUS at 09:24

## 2022-02-10 RX ADMIN — PANTOPRAZOLE SODIUM 40 MILLIGRAM(S): 20 TABLET, DELAYED RELEASE ORAL at 21:13

## 2022-02-10 RX ADMIN — Medication 400 MILLIGRAM(S): at 23:18

## 2022-02-10 RX ADMIN — GABAPENTIN 300 MILLIGRAM(S): 400 CAPSULE ORAL at 09:25

## 2022-02-10 RX ADMIN — ONDANSETRON 4 MILLIGRAM(S): 8 TABLET, FILM COATED ORAL at 23:18

## 2022-02-10 RX ADMIN — Medication 0.12 MILLIGRAM(S): at 19:35

## 2022-02-10 RX ADMIN — Medication 15 MILLIGRAM(S): at 23:29

## 2022-02-10 NOTE — PROGRESS NOTE ADULT - ASSESSMENT
26 year old female S/p robot assisted laparoscopic sleeve gastrectomy POD#0    - continue NPO with IVF tonight  - patient may have ice chips  - will add cepacol drops  - pain control PRN, DVT prophylaxis  - please call if no void 8 hours postoperatively   26 year old female S/p robot assisted laparoscopic sleeve gastrectomy POD#0, stable    - continue NPO with IVF tonight  - patient may have ice chips  - will add cepacol drops  - pain control PRN, DVT prophylaxis  - please call if no void 8 hours postoperatively

## 2022-02-10 NOTE — PROGRESS NOTE ADULT - SUBJECTIVE AND OBJECTIVE BOX
S/p robot assisted laparoscopic sleeve gastrectomy POD#0  Patient seen and examined at bedside   Admits to abdominal pain 3/10.  Admits to one episode of vomiting in the PACU   Complaining of dry mouth.  Due to void.     Vital Signs Last 24 Hrs  T(F): 98.2 (02-10-22 @ 15:45), Max: 99.5 (02-10-22 @ 09:25)  HR: 76 (02-10-22 @ 15:45)  BP: 118/66 (02-10-22 @ 15:45)  RR: 22 (02-10-22 @ 15:45)  SpO2: 100% (02-10-22 @ 15:45)    GENERAL: Alert, NAD  CHEST/LUNG: respirations nonlabored  ABDOMEN: Steri-strips over port sites x4 clean and dry. Soft, Appropriate tenderness to palpation, Nondistended  EXTREMITIES:  no calf tenderness, No edema    I&O's Detail    10 Feb 2022 07:01  -  10 Feb 2022 16:46  --------------------------------------------------------  IN:    Lactated Ringers: 150 mL    Lactated Ringers Bolus: 750 mL  Total IN: 900 mL    OUT:    Voided (mL): 0 mL  Total OUT: 0 mL    Total NET: 900 mL

## 2022-02-11 ENCOUNTER — TRANSCRIPTION ENCOUNTER (OUTPATIENT)
Age: 27
End: 2022-02-11

## 2022-02-11 VITALS
DIASTOLIC BLOOD PRESSURE: 38 MMHG | HEART RATE: 53 BPM | SYSTOLIC BLOOD PRESSURE: 110 MMHG | TEMPERATURE: 98 F | OXYGEN SATURATION: 99 % | RESPIRATION RATE: 18 BRPM

## 2022-02-11 LAB
ANION GAP SERPL CALC-SCNC: 9 MMOL/L — SIGNIFICANT CHANGE UP (ref 5–17)
BUN SERPL-MCNC: 9 MG/DL — SIGNIFICANT CHANGE UP (ref 7–18)
CALCIUM SERPL-MCNC: 8.8 MG/DL — SIGNIFICANT CHANGE UP (ref 8.4–10.5)
CHLORIDE SERPL-SCNC: 109 MMOL/L — HIGH (ref 96–108)
CO2 SERPL-SCNC: 23 MMOL/L — SIGNIFICANT CHANGE UP (ref 22–31)
CREAT SERPL-MCNC: 1.14 MG/DL — SIGNIFICANT CHANGE UP (ref 0.5–1.3)
GLUCOSE BLDC GLUCOMTR-MCNC: 104 MG/DL — HIGH (ref 70–99)
GLUCOSE BLDC GLUCOMTR-MCNC: 85 MG/DL — SIGNIFICANT CHANGE UP (ref 70–99)
GLUCOSE SERPL-MCNC: 96 MG/DL — SIGNIFICANT CHANGE UP (ref 70–99)
HCT VFR BLD CALC: 28.9 % — LOW (ref 34.5–45)
HGB BLD-MCNC: 8.7 G/DL — LOW (ref 11.5–15.5)
MCHC RBC-ENTMCNC: 23.3 PG — LOW (ref 27–34)
MCHC RBC-ENTMCNC: 30.1 GM/DL — LOW (ref 32–36)
MCV RBC AUTO: 77.3 FL — LOW (ref 80–100)
NRBC # BLD: 0 /100 WBCS — SIGNIFICANT CHANGE UP (ref 0–0)
PLATELET # BLD AUTO: 418 K/UL — HIGH (ref 150–400)
POTASSIUM SERPL-MCNC: 4 MMOL/L — SIGNIFICANT CHANGE UP (ref 3.5–5.3)
POTASSIUM SERPL-SCNC: 4 MMOL/L — SIGNIFICANT CHANGE UP (ref 3.5–5.3)
RBC # BLD: 3.74 M/UL — LOW (ref 3.8–5.2)
RBC # FLD: 14.6 % — HIGH (ref 10.3–14.5)
SODIUM SERPL-SCNC: 141 MMOL/L — SIGNIFICANT CHANGE UP (ref 135–145)
WBC # BLD: 10.35 K/UL — SIGNIFICANT CHANGE UP (ref 3.8–10.5)
WBC # FLD AUTO: 10.35 K/UL — SIGNIFICANT CHANGE UP (ref 3.8–10.5)

## 2022-02-11 PROCEDURE — C9399: CPT

## 2022-02-11 PROCEDURE — 81025 URINE PREGNANCY TEST: CPT

## 2022-02-11 PROCEDURE — 86900 BLOOD TYPING SEROLOGIC ABO: CPT

## 2022-02-11 PROCEDURE — 88307 TISSUE EXAM BY PATHOLOGIST: CPT

## 2022-02-11 PROCEDURE — 85027 COMPLETE CBC AUTOMATED: CPT

## 2022-02-11 PROCEDURE — 36415 COLL VENOUS BLD VENIPUNCTURE: CPT

## 2022-02-11 PROCEDURE — 88312 SPECIAL STAINS GROUP 1: CPT

## 2022-02-11 PROCEDURE — 86850 RBC ANTIBODY SCREEN: CPT

## 2022-02-11 PROCEDURE — 86901 BLOOD TYPING SEROLOGIC RH(D): CPT

## 2022-02-11 PROCEDURE — 80048 BASIC METABOLIC PNL TOTAL CA: CPT

## 2022-02-11 PROCEDURE — 82962 GLUCOSE BLOOD TEST: CPT

## 2022-02-11 PROCEDURE — C1889: CPT

## 2022-02-11 PROCEDURE — S2900: CPT

## 2022-02-11 RX ORDER — ONDANSETRON 8 MG/1
1 TABLET, FILM COATED ORAL
Qty: 9 | Refills: 0
Start: 2022-02-11 | End: 2022-02-13

## 2022-02-11 RX ORDER — ACETAMINOPHEN 500 MG
2 TABLET ORAL
Qty: 30 | Refills: 0
Start: 2022-02-11 | End: 2022-02-15

## 2022-02-11 RX ORDER — INFLUENZA VIRUS VACCINE 15; 15; 15; 15 UG/.5ML; UG/.5ML; UG/.5ML; UG/.5ML
0.5 SUSPENSION INTRAMUSCULAR ONCE
Refills: 0 | Status: DISCONTINUED | OUTPATIENT
Start: 2022-02-11 | End: 2022-02-11

## 2022-02-11 RX ORDER — PANTOPRAZOLE SODIUM 20 MG/1
1 TABLET, DELAYED RELEASE ORAL
Qty: 30 | Refills: 2
Start: 2022-02-11 | End: 2022-05-11

## 2022-02-11 RX ORDER — OMEPRAZOLE 10 MG/1
1 CAPSULE, DELAYED RELEASE ORAL
Qty: 0 | Refills: 0 | DISCHARGE

## 2022-02-11 RX ADMIN — SODIUM CHLORIDE 75 MILLILITER(S): 9 INJECTION, SOLUTION INTRAVENOUS at 05:10

## 2022-02-11 RX ADMIN — ENOXAPARIN SODIUM 40 MILLIGRAM(S): 100 INJECTION SUBCUTANEOUS at 05:12

## 2022-02-11 RX ADMIN — Medication 15 MILLIGRAM(S): at 06:00

## 2022-02-11 RX ADMIN — Medication 1000 MILLIGRAM(S): at 00:09

## 2022-02-11 RX ADMIN — Medication 10 MILLIGRAM(S): at 09:33

## 2022-02-11 RX ADMIN — Medication 1000 MILLIGRAM(S): at 06:13

## 2022-02-11 RX ADMIN — SCOPALAMINE 1 PATCH: 1 PATCH, EXTENDED RELEASE TRANSDERMAL at 07:00

## 2022-02-11 RX ADMIN — Medication 15 MILLIGRAM(S): at 05:23

## 2022-02-11 RX ADMIN — PANTOPRAZOLE SODIUM 40 MILLIGRAM(S): 20 TABLET, DELAYED RELEASE ORAL at 13:19

## 2022-02-11 RX ADMIN — Medication 10 MILLIGRAM(S): at 02:03

## 2022-02-11 RX ADMIN — Medication 400 MILLIGRAM(S): at 05:12

## 2022-02-11 RX ADMIN — Medication 15 MILLIGRAM(S): at 00:09

## 2022-02-11 RX ADMIN — ONDANSETRON 4 MILLIGRAM(S): 8 TABLET, FILM COATED ORAL at 05:22

## 2022-02-11 NOTE — DISCHARGE NOTE PROVIDER - NSDCCPCAREPLAN_GEN_ALL_CORE_FT
PRINCIPAL DISCHARGE DIAGNOSIS  Diagnosis: Morbid (severe) obesity due to excess calories  Assessment and Plan of Treatment:

## 2022-02-11 NOTE — DISCHARGE NOTE PROVIDER - HOSPITAL COURSE
26 yr old female morbid obesity (BMI=46) tried all means to loose weight unable, history of sleep apnea (CPAP mild), PCOS was on metformin but unable to tolerate, Migraines (takes Excedrin) told to stop three days before surgery. Migraines are not constant. Pt had Keratoconus had bilateral corneal transplant. Pt had undergone all the necessary requirements for sleeve gastrectomy. Pt schedule for robotic assisted laparoscopic sleeve gastrectomy possible open on 2/1o/2022.  (03 Feb 2022 16:23)    Pt had robotic assisted laparoscopic sleeve gastrectomy on 2/10/2022. Postoperative course was uncomplicated. Pt tolerated  bariatric clears.  Pt is stable for discharge home

## 2022-02-11 NOTE — DISCHARGE NOTE PROVIDER - NSDCCPTREATMENT_GEN_ALL_CORE_FT
PRINCIPAL PROCEDURE  Procedure: Gastrectomy, sleeve, robot-assisted  Findings and Treatment: Please follow-up with your surgeon in 1-2 weeks; call the office to make an appointment. Call for any fever over 101, nausea, vomiting, severe pain, no passing of gas or bowel movement.   DIET: You may start your bariatric clear liquid diet including protein shakes and vitamins as directed by the dietician in your information packet.   SURGICAL SITES: Keep white steri-strips in place allowing them to fall off on their own. You may shower 48 hours post-operatively but do not bathe or soak in the water for 1-2 weeks; pat dry. If you notice any signs of surgical site infection (ie. redness, swelling, pain, pus drainage), please seek medical care immediately.   ACTIVITY: Do not lift anything heavier than 10 pounds for 2 weeks and avoid strenuous activity for 4-6 weeks.   PAIN CONTROL:  Please crush all of your pills.   You may take Tylenol 1000mg every 8 hours as needed for pain.   MEDICATIONS:  You may take your Zofran for any nausea you have.   Please take your prescribed Omeprazole as directed.

## 2022-02-11 NOTE — DISCHARGE NOTE PROVIDER - NSDCMRMEDTOKEN_GEN_ALL_CORE_FT
ondansetron 4 mg oral disintegrating strip: 1 each orally 3 times a day   prednisoLONE ophthalmic: 2 drop(s) to each affected eye 2 times a day  Protonix 40 mg oral delayed release tablet: 1 tab(s) orally once a day     CRUSH PRIOR TO INGESTION  Tylenol Extra Strength 500 mg oral tablet: 2 tab(s) orally every 8 hours, As Needed -for mild pain - for moderate pain  CRUSH PRIOR TO INGESTION

## 2022-02-11 NOTE — DISCHARGE NOTE PROVIDER - NSDCFUSCHEDAPPT_GEN_ALL_CORE_FT
RACHEAL BROOKE ; 02/22/2022 ; [facility] [address] [facility]  RACHEAL BROOKE ; 02/23/2022 ; [facility] [address] [facility]  RACHEAL BROOKE ; 03/15/2022 ; [facility] [address] [facility]

## 2022-02-11 NOTE — DISCHARGE NOTE NURSING/CASE MANAGEMENT/SOCIAL WORK - NSDCPEFALRISK_GEN_ALL_CORE
For information on Fall & Injury Prevention, visit: [email or web address] OR  [email or web address] OR  https://www.cdc.gov/steadi/patient.html

## 2022-02-11 NOTE — PROGRESS NOTE ADULT - ASSESSMENT
26 year old female S/p robot assisted laparoscopic sleeve gastrectomy 2/10, POD#1    -Pedro clears as tolerated, d/c planning if tolerating and meets criteria   -Anti-emetic with standing Reglan/Zofran staggered 3 hrs from one another  -Pain control with standing Ofirmev q6; SL Levsin for gastric spasms  -DVT ppx with venodynes and Lovenox  -OOB and ambulating as tolerated  -Incentive spirometry   -Discussed with Dr. Benito

## 2022-02-11 NOTE — DISCHARGE NOTE NURSING/CASE MANAGEMENT/SOCIAL WORK - PATIENT PORTAL LINK FT
You can access the FollowMyHealth Patient Portal offered by [facility] by registering at the following website: [email or web address]. By joining [employer]’s FollowMyHealth portal, you will also be able to view your health information using other applications (apps) compatible with our system.

## 2022-02-11 NOTE — PROGRESS NOTE ADULT - SUBJECTIVE AND OBJECTIVE BOX
INTERVAL HPI/OVERNIGHT EVENTS:  Pt seen and examined at bedside.   Pt resting comfortably. No acute complaints.   1 episode of vomiting in PACU yesterday, no N/V since    MEDICATIONS  (STANDING):  acetaminophen   IVPB .. 1000 milliGRAM(s) IV Intermittent every 6 hours  enoxaparin Injectable 40 milliGRAM(s) SubCutaneous every 12 hours  ketorolac   Injectable 15 milliGRAM(s) IV Push every 6 hours  lactated ringers. 1000 milliLiter(s) (75 mL/Hr) IV Continuous <Continuous>  metoclopramide Injectable 10 milliGRAM(s) IV Push <User Schedule>  ondansetron Injectable 4 milliGRAM(s) IV Push <User Schedule>  pantoprazole  Injectable 40 milliGRAM(s) IV Push daily    MEDICATIONS  (PRN):  benzocaine 15 mG/menthol 3.6 mG Lozenge 1 Lozenge Oral every 4 hours PRN Sore Throat  hyoscyamine SL 0.125 milliGRAM(s) SubLingual every 6 hours PRN Gastric Spasms      Vital Signs Last 24 Hrs  T(C): 36.3 (11 Feb 2022 06:22), Max: 37.5 (10 Feb 2022 09:25)  T(F): 97.3 (11 Feb 2022 06:22), Max: 99.5 (10 Feb 2022 09:25)  HR: 65 (11 Feb 2022 06:22) (56 - 97)  BP: 94/58 (11 Feb 2022 06:22) (94/58 - 145/79)  BP(mean): 70 (11 Feb 2022 06:22) (70 - 94)  RR: 17 (11 Feb 2022 06:22) (12 - 23)  SpO2: 99% (11 Feb 2022 06:22) (99% - 100%)    Physical:  General: A&Ox3. NAD.  Respirations: Unlabored   Abdomen: Obese, soft nondistended, appropriate incisional tenderness, dressing C/D/I     I&O's Detail    10 Feb 2022 07:01  -  11 Feb 2022 07:00  --------------------------------------------------------  IN:    Lactated Ringers: 150 mL    Lactated Ringers Bolus: 750 mL  Total IN: 900 mL    OUT:    Voided (mL): 0 mL  Total OUT: 0 mL    Total NET: 900 mL          LABS:                        8.7    10.35 )-----------( 418      ( 11 Feb 2022 06:39 )             28.9             02-11    141  |  109<H>  |  9   ----------------------------<  96  4.0   |  23  |  1.14    Ca    8.8      11 Feb 2022 06:39

## 2022-02-11 NOTE — CHART NOTE - NSCHARTNOTEFT_GEN_A_CORE
Focus note:   Admit with morbid obesity (BMI 46.6). S/p sleeve gastrectomy 1/10. Discussed with RN, pt taking clear liquid diet (bariatric). Pt has  discharge instruction sheet and Bariatric Surgery Nutrition Guidelines. Reviewed diet protocols, emphasis on  adequate protein/fluid intake. Discussed support groups, exercise, sleep and follow-up with MERRY LEVY to monitor. NELIA pappas

## 2022-02-11 NOTE — DISCHARGE NOTE PROVIDER - CARE PROVIDER_API CALL
Brian Benito (MD)  Surgery  [address] [place] [address]  Phone: [phone]  Fax: [phone]  Established Patient  Follow Up Time: 1 week

## 2022-02-16 ENCOUNTER — APPOINTMENT (OUTPATIENT)
Dept: SURGERY | Facility: CLINIC | Age: 27
End: 2022-02-16

## 2022-02-18 LAB — SURGICAL PATHOLOGY STUDY: SIGNIFICANT CHANGE UP

## 2022-02-22 ENCOUNTER — APPOINTMENT (OUTPATIENT)
Dept: SURGERY | Facility: CLINIC | Age: 27
End: 2022-02-22
Payer: COMMERCIAL

## 2022-02-22 VITALS — TEMPERATURE: 98.2 F

## 2022-02-22 VITALS
HEART RATE: 104 BPM | WEIGHT: 261 LBS | DIASTOLIC BLOOD PRESSURE: 75 MMHG | SYSTOLIC BLOOD PRESSURE: 123 MMHG | BODY MASS INDEX: 43.49 KG/M2 | HEIGHT: 65 IN

## 2022-02-22 PROCEDURE — 99024 POSTOP FOLLOW-UP VISIT: CPT

## 2022-02-23 ENCOUNTER — APPOINTMENT (OUTPATIENT)
Dept: GASTROENTEROLOGY | Facility: CLINIC | Age: 27
End: 2022-02-23
Payer: COMMERCIAL

## 2022-02-23 VITALS
TEMPERATURE: 96.6 F | WEIGHT: 260 LBS | OXYGEN SATURATION: 99 % | SYSTOLIC BLOOD PRESSURE: 119 MMHG | DIASTOLIC BLOOD PRESSURE: 81 MMHG | BODY MASS INDEX: 43.32 KG/M2 | HEART RATE: 104 BPM | HEIGHT: 65 IN

## 2022-02-23 DIAGNOSIS — K59.00 CONSTIPATION, UNSPECIFIED: ICD-10-CM

## 2022-02-23 DIAGNOSIS — R10.13 EPIGASTRIC PAIN: ICD-10-CM

## 2022-02-23 DIAGNOSIS — K82.4 CHOLESTEROLOSIS OF GALLBLADDER: ICD-10-CM

## 2022-02-23 PROBLEM — E66.01 MORBID (SEVERE) OBESITY DUE TO EXCESS CALORIES: Chronic | Status: ACTIVE | Noted: 2022-02-03

## 2022-02-23 PROCEDURE — 99214 OFFICE O/P EST MOD 30 MIN: CPT

## 2022-02-23 RX ORDER — PANTOPRAZOLE 40 MG/1
40 TABLET, DELAYED RELEASE ORAL DAILY
Qty: 30 | Refills: 3 | Status: ACTIVE | COMMUNITY
Start: 2022-02-23 | End: 1900-01-01

## 2022-02-23 RX ORDER — SIMETHICONE 180 MG
180 CAPSULE ORAL 4 TIMES DAILY
Qty: 120 | Refills: 3 | Status: ACTIVE | COMMUNITY
Start: 2022-02-23 | End: 1900-01-01

## 2022-02-23 NOTE — HISTORY OF PRESENT ILLNESS
[None] : had no significant interval events [Heartburn] : denies heartburn [Nausea] : denies nausea [Vomiting] : denies vomiting [Diarrhea] : denies diarrhea [Yellow Skin Or Eyes (Jaundice)] : denies jaundice [Abdominal Pain] : denies abdominal pain [Rectal Pain] : denies rectal pain [Wt Loss ___ Lbs] : recent [unfilled] ~Upound(s) weight loss [Constipation] : constipation [Abdominal Swelling] : abdominal swelling [GERD] : gastroesophageal reflux disease [Hiatus Hernia] : hiatus hernia [Wt Gain ___ Lbs] : no recent weight gain [Peptic Ulcer Disease] : no peptic ulcer disease [Pancreatitis] : no pancreatitis [Cholelithiasis] : no cholelithiasis [Kidney Stone] : no kidney stone [Inflammatory Bowel Disease] : no inflammatory bowel disease [Irritable Bowel Syndrome] : no irritable bowel syndrome [Diverticulitis] : no diverticulitis [Alcohol Abuse] : no alcohol abuse [Malignancy] : no malignancy [Abdominal Surgery] : no abdominal surgery [Appendectomy] : no appendectomy [Cholecystectomy] : no cholecystectomy [de-identified] : The patient has a history significant for polycystic ovarian disease. The patient is being followed for bariatric surgery with Dr. Brian Benito.  The patient is s/p gastric sleeve surgery on February 10, 2022 at [facility].  The pathology performed on February 10, 2022 revealed gastric wall segment with chronic inactive gastritis and submucosal hypertrophy of adipose tissue (portion of the stomach).  The patient tolerated the surgery well.   The final blood work performed on February 11, 2022 revealed anemia with a hemoglobin/hematocrit level of 8.7/28.9, respectively, and elevated platelet count of 418,000, and elevated chloride level of 109 mmol/L.  The patient was discharged from the hospital on February 11, 2022 in stable condition.  The patient states that she is feeling better. The patient denies any jaundice or pruritus.  The patient denies any chronic lower back pain. The patient complains of abdominal pain.  The patient describes the abdominal pain as a pressure, intermittent midepigastric discomfort that is nonradiating in nature.  The abdominal pain is unrelated to passing gas or having bowel movements.  The abdominal pain is worse with meals.  The abdominal pain is described as moderate in nature.  The abdominal pain occurs at night and in the morning.  The abdominal pain can occur at any time.   The abdominal pain has never awakened the patient from sleep.  The abdominal pain is relieved with certain medication such as proton pump inhibitors, H2 blockers and antacids.  The abdominal pain is associated with abdominal gas and bloating.  The patient denies any nausea or vomiting.  The patient denies any gastroesophageal reflux disease or dysphagia.  The patient denies any atypical chest pain, shortness of breath or palpitations.  The patient denies any diaphoresis. The patient complains of constipation but denies any diarrhea.  The patient has 1 bowel movement every 2 to 3 days. The patient had been taking Senna daily for the constipation.   The patient complains of a change in bowel habits.  The patient complains of a change in caliber of stool. The patient denies having mucus discharge with the bowel movements.  The patient denies any bright red blood per rectum, melena or hematemesis. The patient denies any rectal pain or rectal pruritus.  The patient complains of weight loss but denies any anorexia.  The patient admits to losing 20 pounds over the past 1 week.  She states that her weight has remained unchanged the following week. The patient attributes the weight loss to recent gastric sleeve surgery.  She denies any fevers or chills.  The patient had an upper endoscopy at the [facility] GI endoscopy suite on July 1, 2021. The upper endoscopy was performed up to the level of the second portion of the duodenum. The upper endoscopy revealed a small hiatal hernia and mild diffuse gastritis. Biopsies were taken of the distal esophagus, antrum, body of stomach and duodenum to assess for esophagitis, gastritis and duodenitis. The pathology revealed distal esophagus with squamo-glandular mucosa with marked chronic inflammation, gastric antral mucosa with marked to moderate chronic gastritis with lymphoid aggregates that was negative for Helicobacter pylori, gastric antral and body mucosa with moderate to severe chronic gastritis with lymphoid aggregates that was negative for Helicobacter pylori and unremarkable duodenal mucosa. The patient tolerated the procedure well. The abdominal ultrasound performed on March 19, 2021 revealed a 6 mm gallbladder polyp.  The patient denies any significant family history of GI problems.  [de-identified] : (-) smoking, (-) ETOH, (-) IVDA\par

## 2022-02-23 NOTE — ASSESSMENT
[FreeTextEntry1] : Dyspepsia: The patient complains of dyspeptic symptoms.  The patient was advised to continue to abide by an anti-gas (low FOD-MAP) diet.  The patient was previously given a pamphlet for anti-gas (low FOD-MAP).  The patient and I reviewed the anti-gas (low FOD-MAP)diet at length again. The patient is to continue on a trial of Simethicone one tablet 4 times a day p.r.n. abdominal pain and gas.\par GERD: The patient was advised to avoid late-night meals and dietary indiscretions.  The patient was advised to avoid fried and fatty foods.  The patient was advised to abide by an anti-GERD diet. The patient was given a pamphlet for anti-GERD.  The patient and I reviewed the anti-GERD diet at length. I recommend a trial of Pantoprazole 40 mg once a day x 3 months for the symptoms.\par Weight Loss: The patient admits of weight loss but denies any anorexia.  The patient admits to losing 20 pounds over the past 1 week.  She states that her weight has remained unchanged the following week. The patient attributes the weight loss to recent gastric sleeve surgery.  \par Gastritis: The patient has a history of gastritis. The patient is to avoid nonsteroidal anti-inflammatory drugs and aspirin. I recommend continue on a trial of Pantoprazole 40 mg once a day for 3 months for the symptoms. \par Obesity: The patient is morbidly obese. The patient was advised to continue to lose weight and exercise. The patient was advised to continue followup with a nutritionist regarding dieting for the weight loss. The patient is losing weight. The patient is s/p upper endoscopy and has no contraindications to proceeding with bariatric surgery. The patient is being followed by bariatric surgeon. The patient is s/p bariatric surgery for obesity gastric sleeve surgery with Dr. Benito on February 11, 2022. The patient tolerated the surgery well. \par Gallbladder Polyps: The patient has gallbladder polyps on prior  abdominal ultrasound.  The patient and I had a long discussion regarding the risks of gallbladder polyps progressing to gallbladder cancer if the size is greater than 1cm.  The patient was told of the importance for follow-up for serial abdominal ultrasounds.  The patient was told of the possible need for a cholecystectomy if the gallbladder polyps are greater than 1cm.  The patient agreed and will follow-up.   I recommend a repeat abdominal ultrasound to assess the gallbladder polyps in 1 month.  \par Imaging Study: I recommend an imaging study to assess the symptoms. I recommend an abdominal ultrasound to assess the gallbladder polyps.\par Follow-up: The patient is to follow-up in the office in 6 months to reassess the symptoms. The patient was told to call the office if any further problems. \par

## 2022-03-03 NOTE — REASON FOR VISIT
[de-identified] : 02/10/2022 [de-identified] :  Robotic assisted laparoscopic sleeve gastrectomy

## 2022-03-03 NOTE — PHYSICAL EXAM
[de-identified] : Normal, PERRL, EOMI, NO conjunctival infections  [de-identified] : Breath sounds equal and bilateral, no wheezing no rales or rhonchi  [de-identified] :  good S1, S2, no m/r/g bilateral  [de-identified] : Incision site are healing well

## 2022-03-03 NOTE — CONSULT LETTER
[Dear  ___] : Dear  [unfilled], [Courtesy Letter:] : I had the pleasure of seeing your patient, [unfilled], in my office today. [Consult Closing:] : Thank you very much for allowing me to participate in the care of this patient.  If you have any questions, please do not hesitate to contact me. [Sincerely,] : Sincerely, [FreeTextEntry3] : Dr Benito

## 2022-03-03 NOTE — HISTORY OF PRESENT ILLNESS
[Procedure: ___] : Procedure performed: [unfilled]  [Date of Surgery: ___] : Date of Surgery:   [unfilled] [Surgeon Name:   ___] : Surgeon Name: Dr. COCHRAN [Pre-Op Weight ___] : Pre-op weight was [unfilled] lbs [Phase 2] : Phase 2 [Walking] : walking [de-identified] : Today patient is doing well, offers no complaints. Denies any fevers, chills, nausea, vomiting, diarrhea or constipation. Patient able to tolerate stage 2 diet with normal bowel movements. Surgical incisions are healing well. No sign of inflammation or exudate.Patient compliant with medications and will start taking vitamins  (MVI, Calcium and vitamin D, and vitamin B 12 ), drinks enough fluids  ( 64 oz daily). Patient denies any pain or discomfort at present time. Patient's questions and concerns addressed to patient's satisfaction. Patient has no recent ER visit post surgery

## 2022-03-03 NOTE — ASSESSMENT
[de-identified] : RACHEAL BROOKE is a 26 year old female who present in the office  s/p  Robotic assisted laparoscopic sleeve gastrectomy on 02/10/2022\par On oral medication and vitamins will start taking this visit. Feels well\par Pre-op weight: 281 lbs\par Ideal body weight: 125 lbs\par Today's weight: 261  lbs. Today's BMI : 43.43 kg/m2\par Excess body weight: 156 lbs\par total weight loss since surgery: 20 lbs\par % of Excess Body Weight Loss: 13 % which is on target. \par - Stay well hydrated.\par - Reinforced need for daily MVI, vit D, and vit B complex.\par - Reinforced need for adequate hydration (at least 64 oz daily) and adequate protein intake (at least 60 g daily)\par - Reinforced need to chew food properly before swallowing. \par - Instructed patient not to eat and drink at the same time and to space them out 30 minutes apart. \par - Instructed patient not to drink from a straw, chew gum, or drink carbonated beverages.\par - Informed patient about post-op support groups held every 3rd Tuesday of the month.\par - Please continue with  stage 2 solid food at this time. \par - Informed on restrictions with exercise at this time. \par - Actigall for 6 month will be started 1 month postop \par - Omeprazole for 3 month started at [facility]\par - Follow-up in 3 weeks\par - Call with concerns. \par - Follow-up blood work 3 month postop

## 2022-03-15 ENCOUNTER — APPOINTMENT (OUTPATIENT)
Dept: SURGERY | Facility: CLINIC | Age: 27
End: 2022-03-15
Payer: COMMERCIAL

## 2022-03-15 VITALS
HEART RATE: 100 BPM | HEIGHT: 65 IN | SYSTOLIC BLOOD PRESSURE: 109 MMHG | BODY MASS INDEX: 43.49 KG/M2 | DIASTOLIC BLOOD PRESSURE: 69 MMHG | WEIGHT: 261 LBS

## 2022-03-15 VITALS — TEMPERATURE: 96.6 F

## 2022-03-15 DIAGNOSIS — E66.01 MORBID (SEVERE) OBESITY DUE TO EXCESS CALORIES: ICD-10-CM

## 2022-03-15 DIAGNOSIS — Z82.0 FAMILY HISTORY OF EPILEPSY AND OTHER DISEASES OF THE NERVOUS SYSTEM: ICD-10-CM

## 2022-03-15 DIAGNOSIS — Z98.84 BARIATRIC SURGERY STATUS: ICD-10-CM

## 2022-03-15 PROCEDURE — 99024 POSTOP FOLLOW-UP VISIT: CPT

## 2022-03-15 RX ORDER — URSODIOL 300 MG/1
300 CAPSULE ORAL
Qty: 60 | Refills: 5 | Status: ACTIVE | COMMUNITY
Start: 2022-03-15 | End: 1900-01-01

## 2022-03-29 NOTE — HISTORY OF PRESENT ILLNESS
[Procedure: ___] : Procedure performed: [unfilled]  [Date of Surgery: ___] : Date of Surgery:   [unfilled] [Surgeon Name:   ___] : Surgeon Name: Dr. COCHRAN [Pre-Op Weight ___] : Pre-op weight was [unfilled] lbs [de-identified] : RACHEAL BROOKE is a 26 year old female who present in the office for a follow up s/p Robotic assisted laparoscopic sleeve gastrectomy on 02/10/2022 Patient able to tolerate stage 3 diet, compliant with medications and vitamins  (MVI, Calcium and vitamin D, and vitamin B 12 ), not drinks enough fluids ( 44 oz daily). Patient denies any pain or discomfort at present time. Patient's questions and concerns addressed to patient's satisfaction. Patient has no recent ER visit post surgery

## 2022-03-29 NOTE — PHYSICAL EXAM
[Normal] : affect appropriate [de-identified] : Breath sounds equal and bilateral, no wheezing no rales or rhonchi  [de-identified] :  good S1, S2, no m/r/g bilateral  [de-identified] : Normoactive bowel sounds, soft and nontender, no hepatosplenomegaly or masses noted, incision sites are healing well

## 2022-03-29 NOTE — PLAN
[FreeTextEntry1] : Please follow up at the office in 2 month and as needed for any questions or concerns

## 2022-03-29 NOTE — ASSESSMENT
[FreeTextEntry1] : RACHEAL BROOKE is a 26 year old female who present in the office s/p Robotic assisted laparoscopic sleeve gastrectomy on 02/10/2022\par \par On oral medication and vitamins.  Feels well\par Pre-op weight: 281 lbs\par Ideal body weight: 125 lbs\par Today's weight: 261 lbs. Today's BMI : 43.43 kg/m2\par Excess body weight: 156 lbs\par total weight loss since surgery: 20 lbs\par % of Excess Body Weight Loss: 13 % which is on target. \par - Stay well hydrated.\par - Reinforced need for daily MVI, vit D, and vit B complex.\par - Reinforced need for adequate hydration (at least 64 oz daily) and adequate protein intake (at least 60 g daily)\par - Reinforced need to chew food properly before swallowing. \par - Instructed patient not to eat and drink at the same time and to space them out 30 minutes apart. \par - Instructed patient not to drink from a straw, chew gum, or drink carbonated beverages.\par - Informed patient about post-op support groups held every 3rd Tuesday of the month.\par - Please continue with stage 3 solid food at this time. \par - Informed no restrictions with exercise at this time. \par - Actigall for 6 month will be started this visit\par - Omeprazole for 3 month started at [facility]\par - Follow-up in 2 month \par - Call with concerns. \par - Follow-up blood work 3 month postop \par

## 2022-05-02 NOTE — HISTORY OF PRESENT ILLNESS
[de-identified] : RACHEAL BROOKE is a 26 year old female who present in the office for a follow up s/p Robotic assisted laparoscopic sleeve gastrectomy on 02/10/2022 Patient able to tolerate stage 3 diet, compliant with medications and vitamins  (MVI, Calcium and vitamin D, and vitamin B 12 ), drinks enough fluids ( _ oz daily). Patient denies any pain or discomfort at present time. Patient's questions and concerns addressed to patient's satisfaction. Patient has no recent ER visit post surgery [Procedure: ___] : Procedure performed: [unfilled]  [Date of Surgery: ___] : Date of Surgery:   [unfilled] [Surgeon Name:   ___] : Surgeon Name: Dr. COCHRAN [Pre-Op Weight ___] : Pre-op weight was [unfilled] lbs

## 2022-05-02 NOTE — ASSESSMENT
[FreeTextEntry1] : RACHEAL BROOKE is a 26 year old female who present in the office s/p Robotic assisted laparoscopic sleeve gastrectomy on 02/10/2022\par \par On oral medication and vitamins.  Feels _\par Pre-op weight: 281 lbs\par Ideal body weight: 125 lbs\par Today's weight: _ lbs. Today's BMI : _ kg/m2\par Excess body weight: 156 lbs\par total weight loss since surgery: _ lbs\par % of Excess Body Weight Loss: _ % which is on target. \par - Stay well hydrated.\par - Reinforced need for daily MVI, vit D, and vit B complex.\par - Reinforced need for adequate hydration (at least 64 oz daily) and adequate protein intake (at least 60 g daily)\par - Reinforced need to chew food properly before swallowing. \par - Instructed patient not to eat and drink at the same time and to space them out 30 minutes apart. \par - Instructed patient not to drink from a straw, chew gum, or drink carbonated beverages.\par - Informed patient about post-op support groups held every 3rd Tuesday of the month.\par - Please continue with stage 3 solid food at this time. \par - Informed no restrictions with exercise at this time. \par - Actigall for 3 more month\par - Omeprazole for 3 month will be stop this visit\par - Follow-up in 2 month \par - Call with concerns. \par - Follow-up blood work 3 month postop \par

## 2022-05-03 ENCOUNTER — APPOINTMENT (OUTPATIENT)
Dept: SURGERY | Facility: CLINIC | Age: 27
End: 2022-05-03

## 2022-06-16 NOTE — H&P PST ADULT - NS NEC GEN PE MLT EXAM PC
Patient Education   Personalized Prevention Plan  You are due for the preventive services outlined below.  Your care team is available to assist you in scheduling these services.  If you have already completed any of these items, please share that information with your care team to update in your medical record.  Health Maintenance Due   Topic Date Due    AORTIC ANEURYSM SCREENING (SYSTEM ASSIGNED)  Never done    FALL RISK ASSESSMENT  06/14/2022    Pneumococcal Vaccine (2 - PCV) 06/15/2022        100 squats and and 100 leg lifts on both sides  
No bruits; no thyromegaly or nodules

## 2022-08-24 ENCOUNTER — APPOINTMENT (OUTPATIENT)
Dept: GASTROENTEROLOGY | Facility: CLINIC | Age: 27
End: 2022-08-24

## 2022-08-30 ENCOUNTER — NON-APPOINTMENT (OUTPATIENT)
Age: 27
End: 2022-08-30

## 2025-01-13 NOTE — H&P PST ADULT - PROBLEM/PLAN-4
Mom, Dariel, stated that patient, Carlos would like to get his 's permit on his [birth year], [birth year]. Explained to Mom that his last well visit has been within the past year but would be due again in February. Mom aware but declined to schedule next well at this time. Also explained to Mom that I would have to schedule an OVS for the 's physical. Mom declined at this time and would like to discuss with Dad before scheduling. She is aware of $55 fee at our office and $25 fee at urgent care. Mom will call back if she decides to schedule.   
DISPLAY PLAN FREE TEXT

## 2025-03-24 NOTE — HISTORY OF PRESENT ILLNESS
Detail Level: Detailed Depth Of Biopsy: dermis Was A Bandage Applied: Yes Size Of Lesion In Cm: 0.4 [de-identified] : RACHEAL BROOKE is a 26 year old female with morbid obesity (BMI 46.26 kg/m2) who present in the office for pre-operative follow-up and weight management program in preparation for bariatric surgery.  Patient is here for monthly weigh ins. Today's weight is 278 lb and BMI 46.26 kg/m2. Patient is making good food choices, working on eating smaller portions and becoming more mindful.  Patient has made a concerted effort to eat more balanced and nutritionally sound meals, and to engage in some level of physical activity on a regular basis. Patient continues to struggle with weight loss despite continuous concerted efforts since the prior visit.\par \par  X Size Of Lesion In Cm: 0 Biopsy Type: H and E Biopsy Method: Personna blade Anesthesia Type: 1% lidocaine with epinephrine Anesthesia Volume In Cc: 0.5 Hemostasis: Electrocautery and Aluminum Chloride Wound Care: Petrolatum Dressing: bandage Destruction After The Procedure: No Type Of Destruction Used: Curettage Curettage Text: The wound bed was treated with curettage after the biopsy was performed. Cryotherapy Text: The wound bed was treated with cryotherapy after the biopsy was performed. Electrodesiccation Text: The wound bed was treated with electrodesiccation after the biopsy was performed. Electrodesiccation And Curettage Text: The wound bed was treated with electrodesiccation and curettage after the biopsy was performed. Silver Nitrate Text: The wound bed was treated with silver nitrate after the biopsy was performed. Lab: [id number] Lab Facility: [id number] Medical Necessity Information: It is in your best interest to select a reason for this procedure from the list below. All of these items fulfill various CMS LCD requirements except the new and changing color options. Consent: Written consent was obtained and risks were reviewed including but not limited to scarring, infection, bleeding, scabbing, incomplete removal, nerve damage and allergy to anesthesia. Post-Care Instructions: I reviewed with the patient in detail post-care instructions. Patient is to keep the biopsy site dry overnight, and then apply bacitracin twice daily until healed. Patient may apply hydrogen peroxide soaks to remove any crusting. Notification Instructions: Patient will be notified of biopsy results. However, patient instructed to call the office if not contacted within 2 weeks. Billing Type: Third-Party Bill Information: Selecting Yes will display possible errors in your note based on the variables you have selected. This validation is only offered as a suggestion for you. PLEASE NOTE THAT THE VALIDATION TEXT WILL BE REMOVED WHEN YOU FINALIZE YOUR NOTE. IF YOU WANT TO FAX A PRELIMINARY NOTE YOU WILL NEED TO TOGGLE THIS TO 'NO' IF YOU DO NOT WANT IT IN YOUR FAXED NOTE.

## (undated) DEVICE — Device

## (undated) DEVICE — TUBING STRYKER PNEUMOSURE HI FLOW INSUFFLATOR

## (undated) DEVICE — TAPE UMBILICAL 1/8 X 18" STRANDS

## (undated) DEVICE — TROCAR COVIDIEN VERSAONE OPTICAL BLADELESS 5MM

## (undated) DEVICE — XI GRASPER TIP UP FENESTRATED

## (undated) DEVICE — BLADE SURGICAL #15 CARBON

## (undated) DEVICE — SOL INJ NS 0.9% 100ML

## (undated) DEVICE — NDL SPINAL 22G X 3.5"

## (undated) DEVICE — ELCTR BOVIE BLADE 3/4" EXTENDED LENGTH 6"

## (undated) DEVICE — APPLICATOR VISTASEAL LAP DUAL 35CM RIGID

## (undated) DEVICE — DRAPE HALF SHEET 40X57"

## (undated) DEVICE — NDL INSUFFLATION SURGINEEDLE 120MM

## (undated) DEVICE — SHELL POWER SIGNIA

## (undated) DEVICE — XI FORCEP CADIERE 8MM

## (undated) DEVICE — ELCTR GROUNDING PAD ADULT COVIDIEN

## (undated) DEVICE — VISIGI 3D SLEEVE GASTRECTOMY 40FR

## (undated) DEVICE — SUT BIOSYN 4-0 18" P-12

## (undated) DEVICE — WRAP COMPRESSION CALF MED

## (undated) DEVICE — PACK GENERAL LAPAROSCOPY

## (undated) DEVICE — TUBING STRYKEFLOW II SUCTION / IRRIGATOR

## (undated) DEVICE — BLANKET WARMER UPPER ADULT

## (undated) DEVICE — POSITIONER MATTRESS HOVERMAT SPU LINK 39"

## (undated) DEVICE — XI STAPLER SUREFORM 60

## (undated) DEVICE — GLV 7.5 ESTEEM BLUE

## (undated) DEVICE — SOL IRR BAG NS 0.9% 3000ML

## (undated) DEVICE — GLV 7.5 PROTEXIS

## (undated) DEVICE — TROCAR COVIDIEN VERSAPORT OPTICAL BLADELESS 15MM STD

## (undated) DEVICE — SYR LUER LOK 20CC

## (undated) DEVICE — VISIGI 3D SLEEVE GASTRECTOMY 36FR

## (undated) DEVICE — SOL IRR POUR H2O 1500ML

## (undated) DEVICE — D HELP - CLEARVIEW CLEARIFY SYSTEM

## (undated) DEVICE — DRSG GAUZE PETROLEUM 3X18"

## (undated) DEVICE — DRAPE LIGHT HANDLE COVER BLUE

## (undated) DEVICE — NDL SURGI 150MM

## (undated) DEVICE — SOL IRR POUR NS 0.9% 1500ML

## (undated) DEVICE — TROCAR COVIDIEN VERSAPORT OPTICAL BLADELESS 12MM STD

## (undated) DEVICE — SUT SOFSILK 2-0 30" V-20

## (undated) DEVICE — LIGASURE MARYLAND JAW LAPAROSCOPIC SEALER 5MM-44CM

## (undated) DEVICE — NDL SPINAL 20G X 3.5"

## (undated) DEVICE — XI SEALER DA VINCI VESSEL

## (undated) DEVICE — POSITIONER MATTRESS PAD CONVOLUTED FOAM

## (undated) DEVICE — NDL HYPO SAFE 22G X 1.5"

## (undated) DEVICE — SUT POLYSORB 0 30" GU-46